# Patient Record
Sex: MALE | Race: BLACK OR AFRICAN AMERICAN | NOT HISPANIC OR LATINO | Employment: UNEMPLOYED | URBAN - METROPOLITAN AREA
[De-identification: names, ages, dates, MRNs, and addresses within clinical notes are randomized per-mention and may not be internally consistent; named-entity substitution may affect disease eponyms.]

---

## 2019-07-26 PROBLEM — Z12.5 PROSTATE CANCER SCREENING: Status: ACTIVE | Noted: 2019-07-26

## 2019-07-26 PROBLEM — R31.29 MICROSCOPIC HEMATURIA: Status: ACTIVE | Noted: 2019-07-26

## 2019-07-26 PROBLEM — R35.1 NOCTURIA: Status: ACTIVE | Noted: 2019-07-26

## 2019-07-26 PROBLEM — N40.0 BENIGN PROSTATIC HYPERPLASIA: Status: ACTIVE | Noted: 2019-07-26

## 2020-01-27 PROBLEM — I73.9 PVD (PERIPHERAL VASCULAR DISEASE): Status: ACTIVE | Noted: 2020-01-27

## 2020-01-27 PROBLEM — E87.6 HYPOKALEMIA: Status: ACTIVE | Noted: 2020-01-27

## 2020-01-27 PROBLEM — E78.5 HYPERLIPIDEMIA LDL GOAL <70: Status: ACTIVE | Noted: 2020-01-27

## 2020-01-27 PROBLEM — E11.9 DIABETES MELLITUS TYPE II, CONTROLLED: Status: ACTIVE | Noted: 2019-11-21

## 2020-01-27 PROBLEM — E03.9 HYPOTHYROIDISM, ADULT: Status: ACTIVE | Noted: 2020-01-27

## 2020-12-10 ENCOUNTER — OFFICE VISIT (OUTPATIENT)
Dept: PODIATRY | Facility: CLINIC | Age: 60
End: 2020-12-10
Payer: MEDICAID

## 2020-12-10 ENCOUNTER — OFFICE VISIT (OUTPATIENT)
Dept: PRIMARY CARE CLINIC | Facility: CLINIC | Age: 60
End: 2020-12-10
Payer: MEDICAID

## 2020-12-10 VITALS
WEIGHT: 163 LBS | TEMPERATURE: 98 F | DIASTOLIC BLOOD PRESSURE: 78 MMHG | HEIGHT: 71 IN | SYSTOLIC BLOOD PRESSURE: 140 MMHG | HEART RATE: 63 BPM | OXYGEN SATURATION: 98 % | BODY MASS INDEX: 22.82 KG/M2

## 2020-12-10 VITALS — WEIGHT: 161.81 LBS | BODY MASS INDEX: 23.17 KG/M2 | HEIGHT: 70 IN

## 2020-12-10 DIAGNOSIS — B35.1 PAIN DUE TO ONYCHOMYCOSIS OF TOENAIL OF RIGHT FOOT: Primary | ICD-10-CM

## 2020-12-10 DIAGNOSIS — E11.51 TYPE II DIABETES MELLITUS WITH PERIPHERAL CIRCULATORY DISORDER: ICD-10-CM

## 2020-12-10 DIAGNOSIS — B35.1 ONYCHOMYCOSIS OF MULTIPLE TOENAILS WITH TYPE 2 DIABETES MELLITUS: ICD-10-CM

## 2020-12-10 DIAGNOSIS — M79.674 PAIN DUE TO ONYCHOMYCOSIS OF TOENAIL OF RIGHT FOOT: Primary | ICD-10-CM

## 2020-12-10 DIAGNOSIS — L03.031: ICD-10-CM

## 2020-12-10 DIAGNOSIS — N40.0 BENIGN PROSTATIC HYPERPLASIA, UNSPECIFIED WHETHER LOWER URINARY TRACT SYMPTOMS PRESENT: ICD-10-CM

## 2020-12-10 DIAGNOSIS — I10 ESSENTIAL HYPERTENSION: ICD-10-CM

## 2020-12-10 DIAGNOSIS — I25.2 HISTORY OF NON-ST ELEVATION MYOCARDIAL INFARCTION (NSTEMI): ICD-10-CM

## 2020-12-10 DIAGNOSIS — E11.69 ONYCHOMYCOSIS OF MULTIPLE TOENAILS WITH TYPE 2 DIABETES MELLITUS: ICD-10-CM

## 2020-12-10 DIAGNOSIS — E11.9 TYPE 2 DIABETES MELLITUS WITHOUT COMPLICATION, WITHOUT LONG-TERM CURRENT USE OF INSULIN: ICD-10-CM

## 2020-12-10 DIAGNOSIS — B35.1 ONYCHOMYCOSIS DUE TO DERMATOPHYTE: ICD-10-CM

## 2020-12-10 DIAGNOSIS — I25.10 CORONARY ARTERY DISEASE, ANGINA PRESENCE UNSPECIFIED, UNSPECIFIED VESSEL OR LESION TYPE, UNSPECIFIED WHETHER NATIVE OR TRANSPLANTED HEART: ICD-10-CM

## 2020-12-10 DIAGNOSIS — L03.031: Primary | ICD-10-CM

## 2020-12-10 DIAGNOSIS — E03.9 HYPOTHYROIDISM, UNSPECIFIED TYPE: ICD-10-CM

## 2020-12-10 LAB — HM FOOT EXAM: NORMAL

## 2020-12-10 PROCEDURE — 99203 PR OFFICE/OUTPT VISIT, NEW, LEVL III, 30-44 MIN: ICD-10-PCS | Mod: 25,S$PBB,, | Performed by: PODIATRIST

## 2020-12-10 PROCEDURE — 99215 OFFICE O/P EST HI 40 MIN: CPT | Mod: PBBFAC,27,PN | Performed by: PODIATRIST

## 2020-12-10 PROCEDURE — 99999 PR PBB SHADOW E&M-EST. PATIENT-LVL V: ICD-10-PCS | Mod: PBBFAC,,, | Performed by: PODIATRIST

## 2020-12-10 PROCEDURE — 99204 OFFICE O/P NEW MOD 45 MIN: CPT | Mod: S$PBB,,, | Performed by: FAMILY MEDICINE

## 2020-12-10 PROCEDURE — 99203 OFFICE O/P NEW LOW 30 MIN: CPT | Mod: 25,S$PBB,, | Performed by: PODIATRIST

## 2020-12-10 PROCEDURE — 99999 PR PBB SHADOW E&M-EST. PATIENT-LVL V: CPT | Mod: PBBFAC,,, | Performed by: PODIATRIST

## 2020-12-10 PROCEDURE — 99214 OFFICE O/P EST MOD 30 MIN: CPT | Mod: PBBFAC,PN | Performed by: FAMILY MEDICINE

## 2020-12-10 PROCEDURE — 99204 PR OFFICE/OUTPT VISIT, NEW, LEVL IV, 45-59 MIN: ICD-10-PCS | Mod: S$PBB,,, | Performed by: FAMILY MEDICINE

## 2020-12-10 PROCEDURE — 99999 PR PBB SHADOW E&M-EST. PATIENT-LVL IV: CPT | Mod: PBBFAC,,, | Performed by: FAMILY MEDICINE

## 2020-12-10 PROCEDURE — 99999 PR PBB SHADOW E&M-EST. PATIENT-LVL IV: ICD-10-PCS | Mod: PBBFAC,,, | Performed by: FAMILY MEDICINE

## 2020-12-10 PROCEDURE — 11720 DEBRIDE NAIL 1-5: CPT | Mod: Q8,PBBFAC,PN | Performed by: PODIATRIST

## 2020-12-10 PROCEDURE — 11720 ROUTINE FOOT CARE: ICD-10-PCS | Mod: S$PBB,,, | Performed by: PODIATRIST

## 2020-12-10 RX ORDER — LISINOPRIL 10 MG/1
20 TABLET ORAL DAILY
Status: ON HOLD
Start: 2020-12-10 | End: 2022-10-29 | Stop reason: SDUPTHER

## 2020-12-10 RX ORDER — METOPROLOL SUCCINATE 25 MG/1
TABLET, EXTENDED RELEASE ORAL
COMMUNITY
Start: 2020-11-11

## 2020-12-10 RX ORDER — LISINOPRIL 10 MG/1
10 TABLET ORAL
COMMUNITY
Start: 2020-03-27 | End: 2020-12-10 | Stop reason: SDUPTHER

## 2020-12-10 RX ORDER — ASPIRIN 81 MG/1
81 TABLET ORAL DAILY
Qty: 90 TABLET | Refills: 1 | Status: ON HOLD | OUTPATIENT
Start: 2020-12-10 | End: 2022-10-29 | Stop reason: SDUPTHER

## 2020-12-10 RX ORDER — ISOSORBIDE MONONITRATE 30 MG/1
30 TABLET, EXTENDED RELEASE ORAL DAILY
COMMUNITY

## 2020-12-10 RX ORDER — MUPIROCIN 20 MG/G
OINTMENT TOPICAL 3 TIMES DAILY
Qty: 1 TUBE | Refills: 0 | Status: SHIPPED | OUTPATIENT
Start: 2020-12-10

## 2020-12-10 NOTE — PROGRESS NOTES
Clinic Note  12/10/2020      Subjective:       Patient ID:  Johny is a 60 y.o. male being seen for an new visit.      Chief Complaint: Establish Care and Toe Pain (Right side(next to big toe))    Establish care-was incarcerated for 30 years for attempted robbery and attempted homicide.  Was released earlier this year and went to [facility] for care.  Living with older brother.  Would like to establish care here.      Right 2nd toe pain-present over the last several weeks and patient reports erythema, swelling, pain.  Treated with topical antibiotic which helps some, but still painful.  Patient was supposed to get referral for Podiatry, but for some reason unable to follow-up.    Hypothyroid-taking Synthroid    Hypertension / coronary artery disease / history of NSTEMI-has been out of aspirin not taking it.  Taking Imdur, lisinopril 10 mg, metoprolol, statin.    Type 2 diabetes-taking metformin    BPH-taking terazosin      History reviewed. No pertinent family history.  Social History     Socioeconomic History    Marital status: Single     Spouse name: Not on file    Number of children: Not on file    Years of education: Not on file    Highest education level: Not on file   Occupational History    Not on file   Social Needs    Financial resource strain: Not on file    Food insecurity     Worry: Not on file     Inability: Not on file    Transportation needs     Medical: Not on file     Non-medical: Not on file   Tobacco Use    Smoking status: Never Smoker    Smokeless tobacco: Former User   Substance and Sexual Activity    Alcohol use: Never     Frequency: Never    Drug use: Never    Sexual activity: Not on file   Lifestyle    Physical activity     Days per week: Not on file     Minutes per session: Not on file    Stress: Not on file   Relationships    Social connections     Talks on phone: Not on file     Gets together: Not on file     Attends [employer] service: Not on file     Active member of club or  organization: Not on file     Attends meetings of clubs or organizations: Not on file     Relationship status: Not on file   Other Topics Concern    Not on file   Social History Narrative    Not on file     History reviewed. No pertinent surgical history.  Medication List with Changes/Refills   New Medications    MUPIROCIN (BACTROBAN) 2 % OINTMENT    Apply topically 3 (three) times daily.   Current Medications    ISOSORBIDE MONONITRATE (IMDUR) 30 MG 24 HR TABLET    Take 30 mg by mouth once daily.    LEVOTHYROXINE (SYNTHROID) 25 MCG TABLET    Take 50 mcg by mouth.    METFORMIN (GLUCOPHAGE) 500 MG TABLET    Take 500 mg by mouth 2 (two) times daily with meals.    METOPROLOL SUCCINATE (TOPROL-XL) 25 MG 24 HR TABLET    TK 1/2 T PO QD    NITROGLYCERIN (NITROSTAT) 0.4 MG SL TABLET    Place 0.4 mg under the tongue.    ROSUVASTATIN (CRESTOR) 40 MG TAB    Take 1 tablet (40 mg total) by mouth every evening.    TERAZOSIN (HYTRIN) 2 MG CAPSULE    Take 1 capsule (2 mg total) by mouth once daily.   Changed and/or Refilled Medications    Modified Medication Previous Medication    ASPIRIN (ECOTRIN) 81 MG EC TABLET aspirin (ECOTRIN) 81 MG EC tablet       Take 1 tablet (81 mg total) by mouth once daily.    Take 81 mg by mouth.    LISINOPRIL 10 MG TABLET lisinopriL 10 MG tablet       Take 2 tablets (20 mg total) by mouth once daily.    Take 10 mg by mouth.   Discontinued Medications    ACETAMINOPHEN (TYLENOL) 325 MG TABLET    Take 650 mg by mouth.    AMLODIPINE (NORVASC) 2.5 MG TABLET    Take 5 mg by mouth.    CALCIUM CARBONATE (TUMS) 200 MG CALCIUM (500 MG) CHEWABLE TABLET    Take 2 tablets by mouth 2 (two) times daily as needed for Heartburn.    FINASTERIDE (PROSCAR) 5 MG TABLET    Take 1 tablet (5 mg total) by mouth once daily.    IBUPROFEN (ADVIL,MOTRIN) 600 MG TABLET    Take 600 mg by mouth.    LISINOPRIL 10 MG TABLET    Take 10 mg by mouth once daily.    METOPROLOL TARTRATE (LOPRESSOR) 25 MG TABLET    Take 12.5 mg by mouth.     "OMEPRAZOLE (PRILOSEC) 20 MG CAPSULE    Take 20 mg by mouth.     Patient Active Problem List   Diagnosis    Benign prostatic hyperplasia    Microscopic hematuria    Prostate cancer screening    Nocturia    CAD (coronary artery disease)    Chest pain on exertion    Claudication, class II    Type 2 diabetes mellitus without complication, without long-term current use of insulin    Hemoptysis    Essential hypertension    Hypokalemia    Hypothyroidism    Laryngopharyngeal reflux (LPR)    NSTEMI (non-ST elevated myocardial infarction)    Perforation of tympanic membrane    PVD (peripheral vascular disease)    Hyperlipidemia LDL goal <70     Review of Systems   Constitutional: Negative for chills, fever, malaise/fatigue and weight loss.   HENT: Negative for congestion, sinus pain and sore throat.    Respiratory: Negative for cough, shortness of breath and wheezing.    Cardiovascular: Negative for chest pain and palpitations.   Gastrointestinal: Negative for constipation, diarrhea, nausea and vomiting.   Genitourinary: Negative for dysuria, frequency and urgency.   Musculoskeletal: Negative for myalgias.   Skin: Negative for rash.   Neurological: Negative for headaches.         Objective:      BP (!) 140/78 (BP Location: Left arm, Patient Position: Sitting, BP Method: Large (Manual))   Pulse 63   Temp 98.2 °F (36.8 °C) (Oral)   Ht 5' 10.5" (1.791 m)   Wt 73.9 kg (163 lb 0.5 oz)   SpO2 98%   BMI 23.06 kg/m²   Estimated body mass index is 23.06 kg/m² as calculated from the following:    Height as of this encounter: 5' 10.5" (1.791 m).    Weight as of this encounter: 73.9 kg (163 lb 0.5 oz).  Physical Exam   Constitutional: He is oriented to person, place, and time and well-developed, well-nourished, and in no distress. No distress.   HENT:   Head: Normocephalic and atraumatic.   Eyes: Conjunctivae and EOM are normal.   Neck: Normal range of motion.   Cardiovascular: Normal rate, regular rhythm and " normal heart sounds.   Pulmonary/Chest: Effort normal and breath sounds normal. No respiratory distress. He has no wheezes.   Abdominal: Soft. Bowel sounds are normal.   Musculoskeletal: Normal range of motion.        Feet:    Neurological: He is alert and oriented to person, place, and time. GCS score is 15.   Skin: Skin is warm and dry. No rash noted. He is not diaphoretic. No erythema.   Psychiatric: Affect normal.   Vitals reviewed.        Assessment and Plan:     1. Inflammation of toenail, right  - does not appear infected at this time, but will refer to podiatry for toenail clipping.  Can continue Bactroban p.r.n.  - Ambulatory referral/consult to Podiatry; Future  - mupirocin (BACTROBAN) 2 % ointment; Apply topically 3 (three) times daily.  Dispense: 1 Tube; Refill: 0    2. History of non-ST elevation myocardial infarction (NSTEMI)  - stressed the importance of taking aspirin.  Continue aspirin, Imdur, metoprolol, Crestor.  - aspirin (ECOTRIN) 81 MG EC tablet; Take 1 tablet (81 mg total) by mouth once daily.  Dispense: 90 tablet; Refill: 1    3. Coronary artery disease, angina presence unspecified, unspecified vessel or lesion type, unspecified whether native or transplanted heart  - see above  - aspirin (ECOTRIN) 81 MG EC tablet; Take 1 tablet (81 mg total) by mouth once daily.  Dispense: 90 tablet; Refill: 1    4. Essential hypertension  - blood pressure 140/78, not controlled.  Increase from lisinopril 10 mg to 20 mg daily.  Will recheck BMP next visit.    5. Hypothyroidism, unspecified type  - on Synthroid 25 mcg.  Will check TSH next visit    6. Type 2 diabetes mellitus without complication, without long-term current use of insulin  - on metformin 500 mg b.i.d..  Last hemoglobin A1c 7.1.  Well controlled.    7. Benign prostatic hyperplasia, unspecified whether lower urinary tract symptoms present  - continue terazosin        Follow up:   Follow up for 2 weeks for BP recheck and chronic med f/u.  Patient  to bring all his pill bottles    Other Orders Placed This Visit:  Orders Placed This Encounter   Procedures    Ambulatory referral/consult to Podiatry     Standing Status:   Future     Number of Occurrences:   1     Standing Expiration Date:   1/10/2022     Referral Priority:   Routine     Referral Type:   Consultation     Referral Reason:   Specialty Services Required     Requested Specialty:   Podiatry     Number of Visits Requested:   1           Alvino Ennis MD

## 2020-12-10 NOTE — PATIENT INSTRUCTIONS
For your fungal(mycotic) nails, pick one & use for at least 3-6 months:    1. Listerine mouthwash (yellow/gold) - soak for 5-10minutes daily (may re-use solution up to a week)    2. Vicks Vaporub to nails daily after a bath/end of day/bedtime.    3. Lamisil (terbanafine) 1% antifungal cream daily to nails after shower.

## 2020-12-10 NOTE — LETTER
December 23, 2020      Alvino Ennis MD  [address]  [facility] [address]           [facility]  [address], [facility] [address]  [facility] [address]  Phone: [phone]  Fax: [phone]          Patient: Johny Farrell   MR Number: [id number]   YOB: 1960   Date of Visit: 12/10/2020       Dear Dr. Dunbar:    Thank you for referring Johny Farrell to me for evaluation. Attached you will find relevant portions of my assessment and plan of care.    If you have questions, please do not hesitate to call me. I look forward to following Johny Farrell along with you.    Sincerely,    Bibiana Pang, DPPROSPER    Enclosure  CC:  No Recipients    If you would like to receive this communication electronically, please contact [email or web address] or [phone] to request more information on [employer] access.    For providers and/or their staff who would like to refer a patient to [facility], please contact us through our one-stop-shop provider referral line, [facility], at [phone].    If you feel you have received this communication in error or would no longer like to receive these types of communications, please e-mail [email or web address]

## 2020-12-14 PROBLEM — E11.51 TYPE II DIABETES MELLITUS WITH PERIPHERAL CIRCULATORY DISORDER: Status: ACTIVE | Noted: 2019-11-21

## 2020-12-14 NOTE — PROGRESS NOTES
Subjective:      Patient ID: Johny Farrell is a 60 y.o. male.    Chief Complaint: Nail Care (right toe 2nd toe )    Johny is a 60 y.o. male who presents to the clinic for evaluation and treatment of high risk feet.   Johny has a past medical history of Benign prostatic hyperplasia (7/26/2019), BPH (benign prostatic hyperplasia), CAD (coronary artery disease) (3/9/2015), Chest pain on exertion (4/20/2015), Claudication, class II (12/22/2015), Diabetes mellitus type II, controlled (11/21/2019), Hemoptysis (12/12/2014), HTN (hypertension) (3/9/2015), Hyperlipidemia LDL goal <70 (1/27/2020), Hypokalemia (1/27/2020), Hypothyroidism, adult (1/27/2020), Laryngopharyngeal reflux (LPR) (12/12/2014), Microscopic hematuria (7/26/2019), Nocturia (7/26/2019), NSTEMI (non-ST elevated myocardial infarction) (3/9/2015), Perforation of tympanic membrane (5/3/2013), Prostate cancer screening (7/26/2019), and PVD (peripheral vascular disease) (1/27/2020).   The patient's chief complaint is long, thick toenail right 2nd.   This patient has documented high risk feet requiring routine maintenance secondary to diabetes mellitis and those secondary complications of diabetes, as mentioned.    PCP: Alvino Ennis MD    Date Last Seen by PCP: today 12/10/20    Hemoglobin A1C   Date Value Ref Range Status   07/22/2020 7.1 (H) 4.7 - 5.6 % Final        Objective:      Review of Systems   Constitution: Negative for malaise/fatigue.   Cardiovascular: Positive for claudication and dyspnea on exertion. Negative for leg swelling.   Skin: Positive for color change, dry skin and nail changes. Negative for itching, rash, suspicious lesions and unusual hair distribution.   Musculoskeletal: Negative for arthritis, back pain, joint pain and muscle weakness.   Neurological: Negative for paresthesias, sensory change and weakness.   Psychiatric/Behavioral: The patient is not nervous/anxious.        Physical Exam  Vitals reviewed: BP cuff not functioning.    Constitutional:       General: He is not in acute distress.     Appearance: Normal appearance. He is well-developed and normal weight.   Cardiovascular:      Pulses:           Dorsalis pedis pulses are 0 on the right side and 0 on the left side.        Posterior tibial pulses are 2+ on the right side and 2+ on the left side.   Musculoskeletal: Normal range of motion.         General: No swelling.      Right lower leg: No edema.      Left lower leg: No edema.   Feet:      Right foot:      Skin integrity: Skin integrity normal. No erythema, warmth or callus.      Toenail Condition: Right toenails are abnormally thick. Fungal disease present.     Left foot:      Skin integrity: No warmth.   Skin:     General: Skin is warm and dry.      Capillary Refill: Capillary refill takes 2 to 3 seconds.      Findings: No bruising, erythema, lesion or rash.      Nails: There is no clubbing.     Neurological:      Mental Status: He is alert and oriented to person, place, and time.      Sensory: Sensation is intact. No sensory deficit.      Motor: Motor function is intact. No weakness or abnormal muscle tone.      Gait: Gait is intact. Gait normal.   Psychiatric:         Mood and Affect: Mood and affect normal.         Behavior: Behavior normal. Behavior is cooperative.          Assessment:      Encounter Diagnoses   Name Primary?    Inflammation of toenail, right     Pain due to onychomycosis of toenail of right foot Yes    Onychomycosis of multiple toenails with type 2 diabetes mellitus     Type II diabetes mellitus with peripheral circulatory disorder     Onychomycosis due to dermatophyte      Problem List Items Addressed This Visit        Derm    Pain due to onychomycosis of toenail of right foot - Primary    Current Assessment & Plan     Significant dystrophic changes, thickening & discoloration of nail right 2nd toe, w/ distal impingement but no acute inflammation; no erythema nor open wound noted.  Onychogryphotic nail  right 2nd  Nail debrided & reduced sharply w/ sterile nail nippers - relief noted.         Relevant Orders    Routine Foot Care    Onychomycosis of multiple toenails with type 2 diabetes mellitus    Current Assessment & Plan     May continue Clotrimazole cream if preferred.  Other OTC antifungal tx options provided w/ instructions.  Nails 1-5 R thickened w/ subungual debris & discolored, consistent w/ mycosis. Left shoe not removed.         Relevant Orders    Routine Foot Care       ID    Inflammation of toenail, right    Relevant Orders    Routine Foot Care       Endocrine    Type II diabetes mellitus with peripheral circulatory disorder    Relevant Orders    Routine Foot Care      Other Visit Diagnoses     Onychomycosis due to dermatophyte                Plan:      Johny was seen today for nail care.    Diagnoses and all orders for this visit:    Pain due to onychomycosis of toenail of right foot  -     Routine Foot Care    Inflammation of toenail, right  -     Ambulatory referral/consult to Podiatry  -     Routine Foot Care    Onychomycosis of multiple toenails with type 2 diabetes mellitus  -     Routine Foot Care    Type II diabetes mellitus with peripheral circulatory disorder  -     Routine Foot Care    Onychomycosis due to dermatophyte    I counseled the patient on his conditions, their implications and medical management.    - Shoe inspection. Diabetic Foot Education. Patient reminded of the importance of good nutrition and blood sugar control to help prevent podiatric complications of diabetes. Patient instructed on proper foot hygeine. We discussed wearing proper shoe gear, daily foot inspections, never walking without protective shoe gear, never putting sharp instruments to feet.      - With patient's permission, nails were aggressively reduced and debrided R foot to their soft tissue attachment mechanically, removing all offending nail and debris. Patient relates relief following the procedure. He  will continue to monitor the areas daily, inspect his feet, wear protective shoe gear when ambulatory, moisturizer to maintain skin integrity and follow in this office in annually for DM foot care, sooner prn.

## 2020-12-22 ENCOUNTER — OFFICE VISIT (OUTPATIENT)
Dept: PRIMARY CARE CLINIC | Facility: CLINIC | Age: 60
End: 2020-12-22
Payer: MEDICAID

## 2020-12-22 VITALS
HEART RATE: 76 BPM | SYSTOLIC BLOOD PRESSURE: 122 MMHG | HEIGHT: 70 IN | BODY MASS INDEX: 23.39 KG/M2 | OXYGEN SATURATION: 98 % | WEIGHT: 163.38 LBS | TEMPERATURE: 98 F | DIASTOLIC BLOOD PRESSURE: 78 MMHG

## 2020-12-22 DIAGNOSIS — E03.9 HYPOTHYROIDISM, UNSPECIFIED TYPE: ICD-10-CM

## 2020-12-22 DIAGNOSIS — Z23 ENCOUNTER FOR ADMINISTRATION OF VACCINE: ICD-10-CM

## 2020-12-22 DIAGNOSIS — I25.2 HISTORY OF NON-ST ELEVATION MYOCARDIAL INFARCTION (NSTEMI): ICD-10-CM

## 2020-12-22 DIAGNOSIS — Z12.11 COLON CANCER SCREENING: ICD-10-CM

## 2020-12-22 DIAGNOSIS — Z11.59 ENCOUNTER FOR HEPATITIS C SCREENING TEST FOR LOW RISK PATIENT: ICD-10-CM

## 2020-12-22 DIAGNOSIS — E11.51 TYPE II DIABETES MELLITUS WITH PERIPHERAL CIRCULATORY DISORDER: ICD-10-CM

## 2020-12-22 DIAGNOSIS — I10 ESSENTIAL HYPERTENSION: Primary | ICD-10-CM

## 2020-12-22 DIAGNOSIS — Z11.4 SCREENING FOR HIV (HUMAN IMMUNODEFICIENCY VIRUS): ICD-10-CM

## 2020-12-22 PROCEDURE — 99999 PR PBB SHADOW E&M-EST. PATIENT-LVL V: ICD-10-PCS | Mod: PBBFAC,,, | Performed by: FAMILY MEDICINE

## 2020-12-22 PROCEDURE — 90686 IIV4 VACC NO PRSV 0.5 ML IM: CPT | Mod: PBBFAC,PN

## 2020-12-22 PROCEDURE — 99215 OFFICE O/P EST HI 40 MIN: CPT | Mod: PBBFAC,PN,25 | Performed by: FAMILY MEDICINE

## 2020-12-22 PROCEDURE — 90472 IMMUNIZATION ADMIN EACH ADD: CPT | Mod: PBBFAC,PN

## 2020-12-22 PROCEDURE — 99396 PREV VISIT EST AGE 40-64: CPT | Mod: S$PBB,,, | Performed by: FAMILY MEDICINE

## 2020-12-22 PROCEDURE — 99396 PR PREVENTIVE VISIT,EST,40-64: ICD-10-PCS | Mod: S$PBB,,, | Performed by: FAMILY MEDICINE

## 2020-12-22 PROCEDURE — 99999 PR PBB SHADOW E&M-EST. PATIENT-LVL V: CPT | Mod: PBBFAC,,, | Performed by: FAMILY MEDICINE

## 2020-12-22 NOTE — PROGRESS NOTES
Verified pt ID using name and . Allergies verified. Administered Pneumococcal Polysaccharide - 23 Valent 0.5 ml in right deltoid per physician order using aseptic technique. Aspirated and no blood return noted. Pt tolerated well with no adverse reactions noted.     Verified pt ID using name and . Allergies verified. Administered Influenza - Quadrivalent - PF *Preferred* (6 months and older) 0.5 ML in left deltoid per physician order using aseptic technique. Aspirated and no blood return noted. Pt tolerated well with no adverse reactions noted.

## 2020-12-22 NOTE — PROGRESS NOTES
Clinic Note  12/22/2020      Subjective:       Patient ID:  Johny is a 60 y.o. male being seen for an established visit.    Chief Complaint: Follow-up    Hypertension-taking blood pressure meds daily without any problems    Right 2nd toe pain-was able to see podiatry and at toenails clipped.  Symptoms are improved.    Hypothyroidism-taking Synthroid without problems    Type 2 diabetes -  taking metformin      Review of Systems   Constitutional: Negative for chills, fever, malaise/fatigue and weight loss.   HENT: Negative for congestion, sinus pain and sore throat.    Respiratory: Negative for cough, shortness of breath and wheezing.    Cardiovascular: Negative for chest pain and palpitations.   Gastrointestinal: Negative for constipation, diarrhea, nausea and vomiting.   Genitourinary: Negative for dysuria, frequency and urgency.   Musculoskeletal: Negative for myalgias.   Skin: Negative for rash.   Neurological: Negative for headaches.       Medication List with Changes/Refills   Current Medications    ASPIRIN (ECOTRIN) 81 MG EC TABLET    Take 1 tablet (81 mg total) by mouth once daily.    ISOSORBIDE MONONITRATE (IMDUR) 30 MG 24 HR TABLET    Take 30 mg by mouth once daily.    LEVOTHYROXINE (SYNTHROID) 25 MCG TABLET    Take 50 mcg by mouth.    LISINOPRIL 10 MG TABLET    Take 2 tablets (20 mg total) by mouth once daily.    METFORMIN (GLUCOPHAGE) 500 MG TABLET    Take 500 mg by mouth 2 (two) times daily with meals.    METOPROLOL SUCCINATE (TOPROL-XL) 25 MG 24 HR TABLET    TK 1/2 T PO QD    MUPIROCIN (BACTROBAN) 2 % OINTMENT    Apply topically 3 (three) times daily.    NITROGLYCERIN (NITROSTAT) 0.4 MG SL TABLET    Place 0.4 mg under the tongue.    ROSUVASTATIN (CRESTOR) 40 MG TAB    Take 1 tablet (40 mg total) by mouth every evening.    TERAZOSIN (HYTRIN) 2 MG CAPSULE    Take 1 capsule (2 mg total) by mouth once daily.       Patient Active Problem List   Diagnosis    Benign prostatic hyperplasia    Microscopic  "hematuria    Prostate cancer screening    Nocturia    CAD (coronary artery disease)    Chest pain on exertion    Claudication, class II    Type II diabetes mellitus with peripheral circulatory disorder    Hemoptysis    Essential hypertension    Hypokalemia    Hypothyroidism    Laryngopharyngeal reflux (LPR)    NSTEMI (non-ST elevated myocardial infarction)    Perforation of tympanic membrane    PVD (peripheral vascular disease)    Hyperlipidemia LDL goal <70    Pain due to onychomycosis of toenail of right foot    Onychomycosis of multiple toenails with type 2 diabetes mellitus    Inflammation of toenail, right           Objective:      /78 (BP Location: Left arm, Patient Position: Sitting, BP Method: Large (Manual))   Pulse 76   Temp 98 °F (36.7 °C) (Oral)   Ht 5' 10" (1.778 m)   Wt 74.1 kg (163 lb 5.8 oz)   SpO2 98%   BMI 23.44 kg/m²   Estimated body mass index is 23.44 kg/m² as calculated from the following:    Height as of this encounter: 5' 10" (1.778 m).    Weight as of this encounter: 74.1 kg (163 lb 5.8 oz).  Physical Exam   Constitutional: He is oriented to person, place, and time and well-developed, well-nourished, and in no distress. No distress.   HENT:   Head: Normocephalic and atraumatic.   Eyes: Conjunctivae and EOM are normal.   Neck: Normal range of motion.   Cardiovascular: Normal rate, regular rhythm and normal heart sounds.   Pulmonary/Chest: Effort normal and breath sounds normal. No respiratory distress. He has no wheezes.   Abdominal: Soft. Bowel sounds are normal.   Musculoskeletal: Normal range of motion.   Neurological: He is alert and oriented to person, place, and time. GCS score is 15.   Skin: Skin is warm and dry. No rash noted. He is not diaphoretic. No erythema.   Psychiatric: Affect normal.   Vitals reviewed.        Assessment and Plan:     1. Essential hypertension  - blood pressure 122/78.  Continue lisinopril,  metoprolol succinate, Imdur, aspirin, " Crestor  - Basic Metabolic Panel; Future    2. History of non-ST elevation myocardial infarction (NSTEMI)  - stable, on good medication regimen.  Has been seen by cardiology at [facility]    3. Hypothyroidism, unspecified type  - continue Synthroid 50 mcg daily  - TSH; Future    4. Type II diabetes mellitus with peripheral circulatory disorder  - hemoglobin A1c 7.1.  Continue metformin 500 mg b.i.d..  Recheck in 6 months  - referred to optometry for diabetic retinopathy screening  - Ambulatory referral/consult to Optometry; Future    5. Encounter for hepatitis C screening test for low risk patient  - Hepatitis C Antibody; Future    6. Screening for HIV (human immunodeficiency virus)  - HIV 1/2 Ag/Ab (4th Gen); Future    7. Colon cancer screening  - Ambulatory referral/consult to Gastroenterology; Future    8. Encounter for administration of vaccine  - Pneumococcal Polysaccharide Vaccine (23 Valent) (SQ/IM)  - Influenza - Quadrivalent (PF)        Follow Up:   Follow up in about 6 months (around 6/22/2021) for Chronic medical follow-up.    Other Orders Placed This Visit:  Orders Placed This Encounter   Procedures    Pneumococcal Polysaccharide Vaccine (23 Valent) (SQ/IM)    Influenza - Quadrivalent (PF)    TSH    Basic Metabolic Panel    Hepatitis C Antibody    HIV 1/2 Ag/Ab (4th Gen)    Ambulatory referral/consult to Gastroenterology    Ambulatory referral/consult to Optometry         Alvino Ennis MD

## 2020-12-24 NOTE — PROCEDURES
Routine Foot Care    Date/Time: 12/10/2020 3:30 PM  Performed by: Bibiana Pang DPM  Authorized by: Bibiana Pang DPM     Consent Done?:  Yes (Verbal)    Nail Care Type:  Debride(Right 1st Toe, Right 2nd Toe, Right 3rd Toe, Right 4th Toe and Right 5th Toe)  Patient tolerance:  Patient tolerated the procedure well with no immediate complications

## 2020-12-24 NOTE — ASSESSMENT & PLAN NOTE
May continue Clotrimazole cream if preferred.  Other OTC antifungal tx options provided w/ instructions.  Nails 1-5 R thickened w/ subungual debris & discolored, consistent w/ mycosis. Left shoe not removed.

## 2020-12-24 NOTE — ASSESSMENT & PLAN NOTE
Significant dystrophic changes, thickening & discoloration of nail right 2nd toe, w/ distal impingement but no acute inflammation; no erythema nor open wound noted.  Onychogryphotic nail right 2nd  Nail debrided & reduced sharply w/ sterile nail nippers - relief noted.

## 2020-12-30 ENCOUNTER — PATIENT OUTREACH (OUTPATIENT)
Dept: ADMINISTRATIVE | Facility: HOSPITAL | Age: 60
End: 2020-12-30

## 2020-12-30 NOTE — PROGRESS NOTES
Voicemail left with patient for assistance scheduling eye exam and colonoscopy. Will follow up in one week

## 2021-01-21 ENCOUNTER — PATIENT OUTREACH (OUTPATIENT)
Dept: ADMINISTRATIVE | Facility: HOSPITAL | Age: 61
End: 2021-01-21

## 2021-02-04 ENCOUNTER — TELEPHONE (OUTPATIENT)
Dept: PRIMARY CARE CLINIC | Facility: CLINIC | Age: 61
End: 2021-02-04

## 2021-02-04 DIAGNOSIS — Z12.11 COLON CANCER SCREENING: Primary | ICD-10-CM

## 2021-02-19 ENCOUNTER — TELEPHONE (OUTPATIENT)
Dept: PRIMARY CARE CLINIC | Facility: CLINIC | Age: 61
End: 2021-02-19

## 2021-02-19 DIAGNOSIS — Z12.11 SCREENING FOR COLORECTAL CANCER: Primary | ICD-10-CM

## 2021-02-19 DIAGNOSIS — Z12.12 SCREENING FOR COLORECTAL CANCER: Primary | ICD-10-CM

## 2021-03-02 ENCOUNTER — PATIENT OUTREACH (OUTPATIENT)
Dept: ADMINISTRATIVE | Facility: OTHER | Age: 61
End: 2021-03-02

## 2021-03-02 DIAGNOSIS — E11.51 TYPE II DIABETES MELLITUS WITH PERIPHERAL CIRCULATORY DISORDER: ICD-10-CM

## 2021-03-02 DIAGNOSIS — L03.031: Primary | ICD-10-CM

## 2021-03-30 ENCOUNTER — TELEPHONE (OUTPATIENT)
Dept: ENDOSCOPY | Facility: HOSPITAL | Age: 61
End: 2021-03-30

## 2021-06-24 DIAGNOSIS — E11.9 TYPE 2 DIABETES MELLITUS WITHOUT COMPLICATION: ICD-10-CM

## 2021-06-30 DIAGNOSIS — E11.9 TYPE 2 DIABETES MELLITUS WITHOUT COMPLICATION, UNSPECIFIED WHETHER LONG TERM INSULIN USE: ICD-10-CM

## 2021-07-06 ENCOUNTER — PATIENT OUTREACH (OUTPATIENT)
Dept: ADMINISTRATIVE | Facility: HOSPITAL | Age: 61
End: 2021-07-06

## 2021-07-06 DIAGNOSIS — E78.2 MIXED HYPERLIPIDEMIA: Primary | ICD-10-CM

## 2021-07-19 ENCOUNTER — LAB VISIT (OUTPATIENT)
Dept: PRIMARY CARE CLINIC | Facility: CLINIC | Age: 61
End: 2021-07-19
Payer: MEDICAID

## 2021-07-19 ENCOUNTER — CLINICAL SUPPORT (OUTPATIENT)
Dept: PRIMARY CARE CLINIC | Facility: CLINIC | Age: 61
End: 2021-07-19
Payer: MEDICAID

## 2021-07-19 DIAGNOSIS — E11.9 TYPE 2 DIABETES MELLITUS WITHOUT COMPLICATION: ICD-10-CM

## 2021-07-19 DIAGNOSIS — E11.51 TYPE II DIABETES MELLITUS WITH PERIPHERAL CIRCULATORY DISORDER: ICD-10-CM

## 2021-07-19 DIAGNOSIS — E78.2 MIXED HYPERLIPIDEMIA: ICD-10-CM

## 2021-07-19 LAB
CHOLEST SERPL-MCNC: 184 MG/DL (ref 120–199)
CHOLEST/HDLC SERPL: 2.6 {RATIO} (ref 2–5)
ESTIMATED AVG GLUCOSE: 103 MG/DL (ref 68–131)
HBA1C MFR BLD: 5.2 % (ref 4–5.6)
HDLC SERPL-MCNC: 72 MG/DL (ref 40–75)
HDLC SERPL: 39.1 % (ref 20–50)
LDLC SERPL CALC-MCNC: 97.2 MG/DL (ref 63–159)
NONHDLC SERPL-MCNC: 112 MG/DL
TRIGL SERPL-MCNC: 74 MG/DL (ref 30–150)

## 2021-07-19 PROCEDURE — 83036 HEMOGLOBIN GLYCOSYLATED A1C: CPT | Performed by: FAMILY MEDICINE

## 2021-07-19 PROCEDURE — 82570 ASSAY OF URINE CREATININE: CPT | Performed by: FAMILY MEDICINE

## 2021-07-19 PROCEDURE — 36415 COLL VENOUS BLD VENIPUNCTURE: CPT | Mod: PBBFAC,PN

## 2021-07-19 PROCEDURE — 82043 UR ALBUMIN QUANTITATIVE: CPT | Performed by: FAMILY MEDICINE

## 2021-07-19 PROCEDURE — 80061 LIPID PANEL: CPT | Performed by: FAMILY MEDICINE

## 2021-07-20 LAB
ALBUMIN/CREAT UR: 3.2 UG/MG (ref 0–30)
CREAT UR-MCNC: 279 MG/DL (ref 23–375)
MICROALBUMIN UR DL<=1MG/L-MCNC: 9 UG/ML

## 2021-07-21 DIAGNOSIS — I25.10 CORONARY ARTERY DISEASE, ANGINA PRESENCE UNSPECIFIED, UNSPECIFIED VESSEL OR LESION TYPE, UNSPECIFIED WHETHER NATIVE OR TRANSPLANTED HEART: ICD-10-CM

## 2021-07-21 DIAGNOSIS — E78.5 HYPERLIPIDEMIA LDL GOAL <70: ICD-10-CM

## 2021-07-21 RX ORDER — ROSUVASTATIN CALCIUM 20 MG/1
20 TABLET, COATED ORAL NIGHTLY
Qty: 90 TABLET | Refills: 3 | Status: SHIPPED | OUTPATIENT
Start: 2021-07-21

## 2021-08-02 ENCOUNTER — PATIENT OUTREACH (OUTPATIENT)
Dept: ADMINISTRATIVE | Facility: HOSPITAL | Age: 61
End: 2021-08-02

## 2021-08-16 ENCOUNTER — OFFICE VISIT (OUTPATIENT)
Dept: PRIMARY CARE CLINIC | Facility: CLINIC | Age: 61
End: 2021-08-16
Payer: MEDICAID

## 2021-08-16 VITALS
BODY MASS INDEX: 21.9 KG/M2 | HEIGHT: 70 IN | OXYGEN SATURATION: 97 % | WEIGHT: 153 LBS | HEART RATE: 81 BPM | DIASTOLIC BLOOD PRESSURE: 72 MMHG | SYSTOLIC BLOOD PRESSURE: 130 MMHG

## 2021-08-16 DIAGNOSIS — M25.562 CHRONIC PAIN OF LEFT KNEE: Primary | ICD-10-CM

## 2021-08-16 DIAGNOSIS — I25.10 CORONARY ARTERY DISEASE, ANGINA PRESENCE UNSPECIFIED, UNSPECIFIED VESSEL OR LESION TYPE, UNSPECIFIED WHETHER NATIVE OR TRANSPLANTED HEART: ICD-10-CM

## 2021-08-16 DIAGNOSIS — G89.29 CHRONIC PAIN OF LEFT KNEE: Primary | ICD-10-CM

## 2021-08-16 DIAGNOSIS — N52.9 ERECTILE DYSFUNCTION, UNSPECIFIED ERECTILE DYSFUNCTION TYPE: ICD-10-CM

## 2021-08-16 DIAGNOSIS — E03.9 HYPOTHYROIDISM, UNSPECIFIED TYPE: ICD-10-CM

## 2021-08-16 DIAGNOSIS — H53.8 BLURRY VISION: ICD-10-CM

## 2021-08-16 PROCEDURE — 99999 PR PBB SHADOW E&M-EST. PATIENT-LVL V: ICD-10-PCS | Mod: PBBFAC,,, | Performed by: FAMILY MEDICINE

## 2021-08-16 PROCEDURE — 99999 PR PBB SHADOW E&M-EST. PATIENT-LVL V: CPT | Mod: PBBFAC,,, | Performed by: FAMILY MEDICINE

## 2021-08-16 PROCEDURE — 99214 PR OFFICE/OUTPT VISIT, EST, LEVL IV, 30-39 MIN: ICD-10-PCS | Mod: 25,S$PBB,, | Performed by: FAMILY MEDICINE

## 2021-08-16 PROCEDURE — 99214 OFFICE O/P EST MOD 30 MIN: CPT | Mod: 25,S$PBB,, | Performed by: FAMILY MEDICINE

## 2021-08-16 PROCEDURE — 99215 OFFICE O/P EST HI 40 MIN: CPT | Mod: PBBFAC,PN | Performed by: FAMILY MEDICINE

## 2021-08-16 RX ORDER — LIDOCAINE HYDROCHLORIDE 10 MG/ML
4 INJECTION INFILTRATION; PERINEURAL
Status: DISCONTINUED | OUTPATIENT
Start: 2021-08-16 | End: 2021-08-16 | Stop reason: HOSPADM

## 2021-08-16 RX ORDER — TRIAMCINOLONE ACETONIDE 40 MG/ML
40 INJECTION, SUSPENSION INTRA-ARTICULAR; INTRAMUSCULAR
Status: DISCONTINUED | OUTPATIENT
Start: 2021-08-16 | End: 2021-08-16 | Stop reason: HOSPADM

## 2021-08-16 RX ADMIN — TRIAMCINOLONE ACETONIDE 40 MG: 400 INJECTION, SUSPENSION INTRA-ARTICULAR; INTRAMUSCULAR at 04:08

## 2021-08-16 RX ADMIN — LIDOCAINE HYDROCHLORIDE 4 ML: 10 INJECTION, SOLUTION INFILTRATION; PERINEURAL at 04:08

## 2021-09-13 ENCOUNTER — CLINICAL SUPPORT (OUTPATIENT)
Dept: REHABILITATION | Facility: OTHER | Age: 61
End: 2021-09-13
Payer: MEDICAID

## 2021-09-13 DIAGNOSIS — G89.29 CHRONIC PAIN OF LEFT KNEE: ICD-10-CM

## 2021-09-13 DIAGNOSIS — R26.89 GAIT, ANTALGIC: ICD-10-CM

## 2021-09-13 DIAGNOSIS — R29.898 WEAKNESS OF LEFT LEG: ICD-10-CM

## 2021-09-13 DIAGNOSIS — M25.562 CHRONIC PAIN OF LEFT KNEE: ICD-10-CM

## 2021-09-13 PROCEDURE — 97110 THERAPEUTIC EXERCISES: CPT | Mod: PN

## 2021-09-13 PROCEDURE — 97161 PT EVAL LOW COMPLEX 20 MIN: CPT | Mod: PN

## 2021-10-01 ENCOUNTER — TELEPHONE (OUTPATIENT)
Dept: PRIMARY CARE CLINIC | Facility: CLINIC | Age: 61
End: 2021-10-01

## 2021-10-01 DIAGNOSIS — M25.569 KNEE PAIN, UNSPECIFIED CHRONICITY, UNSPECIFIED LATERALITY: Primary | ICD-10-CM

## 2021-10-27 ENCOUNTER — PATIENT OUTREACH (OUTPATIENT)
Dept: ADMINISTRATIVE | Facility: OTHER | Age: 61
End: 2021-10-27
Payer: MEDICAID

## 2021-10-27 ENCOUNTER — HOSPITAL ENCOUNTER (OUTPATIENT)
Dept: RADIOLOGY | Facility: HOSPITAL | Age: 61
Discharge: HOME | End: 2021-10-27
Attending: FAMILY MEDICINE
Payer: MEDICAID

## 2021-10-27 DIAGNOSIS — G89.29 CHRONIC PAIN OF LEFT KNEE: ICD-10-CM

## 2021-10-27 DIAGNOSIS — Z12.11 SCREENING FOR COLON CANCER: Primary | ICD-10-CM

## 2021-10-27 DIAGNOSIS — M25.562 CHRONIC PAIN OF LEFT KNEE: ICD-10-CM

## 2021-10-27 PROCEDURE — 73564 XR KNEE ORTHO LEFT WITH FLEXION: ICD-10-PCS | Mod: 26,LT,, | Performed by: RADIOLOGY

## 2021-10-27 PROCEDURE — 73564 X-RAY EXAM KNEE 4 OR MORE: CPT | Mod: TC,PN,LT

## 2021-10-27 PROCEDURE — 73562 X-RAY EXAM OF KNEE 3: CPT | Mod: 26,RT,, | Performed by: RADIOLOGY

## 2021-10-27 PROCEDURE — 73564 X-RAY EXAM KNEE 4 OR MORE: CPT | Mod: 26,LT,, | Performed by: RADIOLOGY

## 2021-10-27 PROCEDURE — 73562 XR KNEE ORTHO LEFT WITH FLEXION: ICD-10-PCS | Mod: 26,RT,, | Performed by: RADIOLOGY

## 2021-12-01 ENCOUNTER — PATIENT OUTREACH (OUTPATIENT)
Dept: ADMINISTRATIVE | Facility: OTHER | Age: 61
End: 2021-12-01
Payer: MEDICAID

## 2022-02-02 DIAGNOSIS — E11.9 TYPE 2 DIABETES MELLITUS WITHOUT COMPLICATION: ICD-10-CM

## 2022-08-19 ENCOUNTER — HOSPITAL ENCOUNTER (EMERGENCY)
Facility: OTHER | Age: 62
Discharge: HOME | End: 2022-08-19
Attending: EMERGENCY MEDICINE
Payer: MEDICAID

## 2022-08-19 VITALS
WEIGHT: 182 LBS | BODY MASS INDEX: 24.65 KG/M2 | RESPIRATION RATE: 18 BRPM | DIASTOLIC BLOOD PRESSURE: 63 MMHG | OXYGEN SATURATION: 98 % | SYSTOLIC BLOOD PRESSURE: 135 MMHG | HEART RATE: 62 BPM | TEMPERATURE: 98 F | HEIGHT: 72 IN

## 2022-08-19 DIAGNOSIS — L84 CALLUS: ICD-10-CM

## 2022-08-19 DIAGNOSIS — B07.9 VIRAL WARTS, UNSPECIFIED TYPE: Primary | ICD-10-CM

## 2022-08-19 PROCEDURE — 99283 EMERGENCY DEPT VISIT LOW MDM: CPT | Mod: 25

## 2022-08-19 NOTE — ED TRIAGE NOTES
"Pt presents to the ED w/ c/o callus on hands x 2 weeks. Pt also reporting callus on foot. Pt stating "these have been here for years."  "

## 2022-08-19 NOTE — DISCHARGE INSTRUCTIONS
Mr. Farrell,    Thank you for letting me care for you today! It was nice meeting you, and I hope you feel better soon.   If you would like access to your chart and what was done today please utilize the [facility].   Please come back to [facility] for all of your future medical needs.    Our goal in the emergency department is to always give you outstanding care and exceptional service. You may receive a survey by mail or e-mail in the next week regarding your experience in our ED. We would greatly appreciate you completing and returning the survey. Your feedback provides us with a way to recognize our staff who give very good care and it helps us learn how to improve when your experience was below our aspiration of excellence.     Sincerely,    Tom De La Torre MD  Board Certified Emergency Physician

## 2022-08-19 NOTE — ED PROVIDER NOTES
Encounter Date: 8/19/2022    SCRIBE #1 NOTE: I, Carol Ann Herbert, am scribing for, and in the presence of, Tom De La Torre MD.       History     Chief Complaint   Patient presents with    sore callus     Pt c.o callus on hands being sore onset 2 weeks. Pt states they have been present for years.  Pt also states he has one on foot.  AAO x 3 nadn   pt has what appears to be hardened skin/callus on various fingers right and left. No signs of infection      Time seen by provider: 1:00 PM    This is a 62 y.o. male with PMHx of HTN, CAD, DM, PVD, HLD and previous MI, who presents with complaint of calluses to the bilateral hand and foot that worsened 2 weeks ago, but have been present for the past year.  He notes that he was incarcerated, had the PICC gotten frequently it would have small rehan stuck in the hands on occasion which he thinks may have started the issues.  He has not used anything to try and make this any better other than feeling it using nail trimmers on the hand but it came back.   Patient's daily medications include isosorbide, synthroid, lisinopril, metformin, metoprolol and rosuvastatin.     This is the extent of the patient's complaints at this time.    The history is provided by the patient.     Review of patient's allergies indicates:   Allergen Reactions    Anesthetics - amide type - select amino amides Other (See Comments)     REPOTS ALLERGY TO UNKNOWN GENERAL ANESTHETIC, REPORTS WAS INTUBATED  REPOTS ALLERGY TO UNKNOWN GENERAL ANESTHETIC, REPORTS WAS INTUBATED       Past Medical History:   Diagnosis Date    Benign prostatic hyperplasia 7/26/2019    BPH (benign prostatic hyperplasia)     CAD (coronary artery disease) 3/9/2015    Chest pain on exertion 4/20/2015    Claudication, class II 12/22/2015    Left sided.    Diabetes mellitus type II, controlled 11/21/2019    Hemoptysis 12/12/2014    HTN (hypertension) 3/9/2015    Hyperlipidemia LDL goal <70 1/27/2020    Hypokalemia 1/27/2020     Hypothyroidism, adult 1/27/2020    Laryngopharyngeal reflux (LPR) 12/12/2014    Microscopic hematuria 7/26/2019    Nocturia 7/26/2019    NSTEMI (non-ST elevated myocardial infarction) 3/9/2015    Perforation of tympanic membrane 5/3/2013    dx update dx update    Prostate cancer screening 7/26/2019    PVD (peripheral vascular disease) 1/27/2020     No past surgical history on file.  No family history on file.  Social History     Tobacco Use    Smoking status: Never Smoker    Smokeless tobacco: Former User   Substance Use Topics    Alcohol use: Never    Drug use: Never     Review of Systems  Constitutional-no fever  HEENT-no congestion  Eyes-no redness  Respiratory-no shortness of breath  Cardio-no chest pain  GI-no abdominal pain  Endocrine-no cold intolerance  -no difficulty urinating  MSK-no myalgias  Skin-no rashes. +calluses to bilateral hand and foot.  Allergy-no environmental allergy  Neurologic-, no headache  Hematology-no swollen nodes  Behavioral-no confusion    Physical Exam     Initial Vitals [08/19/22 1231]   BP Pulse Resp Temp SpO2   135/63 62 18 98 °F (36.7 °C) 98 %      MAP       --         Physical Exam  Constitutional: Well appearing, no distress.  Eyes: Conjunctivae normal.  ENT       Head: Normocephalic, atraumatic.       Nose: Normal external appearance        Mouth/Throat: no strigulous respirations   Hematological/Lymphatic/Immunilogical: no visible lymphadenopathy   Cardiovascular: Normal rate,   Respiratory: Normal respiratory effort.   Gastrointestinal: non distended   Musculoskeletal: Normal range of motion in all extremities. No obvious deformities or swelling.  Neurologic: Alert, oriented. Normal speech and language. No gross focal neurologic deficits are appreciated.  Skin:  Or the palmar aspect on the right hand are 2 circular thickened lesions on the 2nd and 4th digit, on the left palmar surface over the flexor surface of the 3rd digit is a flat heart and thickened  patch of skin, over the base of the right great toe is skin thickening darkening with slight scaling  Psychiatric: Mood and affect are normal.   ED Course   Procedures  Labs Reviewed - No data to display       Imaging Results    None          Medications - No data to display  Medical Decision Making:   History:   Old Medical Records: I decided to obtain old medical records.  Old Records Summarized: records from clinic visits.  Differential Diagnosis:   Shingles, cellulitis, monkeypox, calluses, warts  ED Management:  Hard skin consistent with likely verrucous changes over the hands.    Plan will be for him to undergo symptomatic treatment and encouraged follow-up in the outpatient setting.  No evidence at this time to suggest significant emergent medical condition.  Vital signs reassuring otherwise well-appearing.          Scribe Attestation:   Scribe #1: I performed the above scribed service and the documentation accurately describes the services I performed. I attest to the accuracy of the note.              Physician Attestation for Scribe: I, tom de la torre, reviewed documentation as scribed in my presence, which is both accurate and complete.      Clinical Impression:   Final diagnoses:  [B07.9] Viral warts, unspecified type (Primary)  [L84] Callus          ED Disposition Condition    Discharge Stable        ED Prescriptions     Medication Sig Dispense Start Date End Date Auth. Provider    salicylic acid 10 % Crea Apply 1 g topically 2 (two) times a day. 227 g 8/19/2022  Tom De La Torre MD        Follow-up Information     Follow up With Specialties Details Why Contact Info    Alvino Ennis MD Family Medicine Call in 1 day If symptoms worsen, For a follow up visit about today [address]  [facility] [address]  [phone]             Tom De La Torre MD  08/19/22 2651

## 2022-10-10 ENCOUNTER — PATIENT OUTREACH (OUTPATIENT)
Dept: ADMINISTRATIVE | Facility: HOSPITAL | Age: 62
End: 2022-10-10
Payer: MEDICAID

## 2022-10-28 ENCOUNTER — HOSPITAL ENCOUNTER (OUTPATIENT)
Facility: HOSPITAL | Age: 62
Discharge: HOME | End: 2022-10-29
Attending: EMERGENCY MEDICINE | Admitting: INTERNAL MEDICINE
Payer: MEDICAID

## 2022-10-28 DIAGNOSIS — I25.10 CORONARY ARTERY DISEASE INVOLVING NATIVE CORONARY ARTERY OF NATIVE HEART WITHOUT ANGINA PECTORIS: Primary | ICD-10-CM

## 2022-10-28 DIAGNOSIS — I25.2 HISTORY OF NON-ST ELEVATION MYOCARDIAL INFARCTION (NSTEMI): ICD-10-CM

## 2022-10-28 DIAGNOSIS — I25.10 CORONARY ARTERY DISEASE, ANGINA PRESENCE UNSPECIFIED, UNSPECIFIED VESSEL OR LESION TYPE, UNSPECIFIED WHETHER NATIVE OR TRANSPLANTED HEART: ICD-10-CM

## 2022-10-28 DIAGNOSIS — R07.9 CHEST PAIN: ICD-10-CM

## 2022-10-28 DIAGNOSIS — R55 NEAR SYNCOPE: ICD-10-CM

## 2022-10-28 DIAGNOSIS — I21.4 NSTEMI (NON-ST ELEVATED MYOCARDIAL INFARCTION): ICD-10-CM

## 2022-10-28 PROCEDURE — 25000003 PHARM REV CODE 250: Performed by: EMERGENCY MEDICINE

## 2022-10-28 PROCEDURE — 93010 EKG 12-LEAD: ICD-10-PCS | Mod: ,,, | Performed by: INTERNAL MEDICINE

## 2022-10-28 PROCEDURE — 93010 ELECTROCARDIOGRAM REPORT: CPT | Mod: ,,, | Performed by: INTERNAL MEDICINE

## 2022-10-28 PROCEDURE — 96360 HYDRATION IV INFUSION INIT: CPT

## 2022-10-28 PROCEDURE — 99285 EMERGENCY DEPT VISIT HI MDM: CPT | Mod: 25

## 2022-10-28 PROCEDURE — 99284 EMERGENCY DEPT VISIT MOD MDM: CPT | Mod: ,,, | Performed by: EMERGENCY MEDICINE

## 2022-10-28 PROCEDURE — 93005 ELECTROCARDIOGRAM TRACING: CPT

## 2022-10-28 PROCEDURE — 99284 PR EMERGENCY DEPT VISIT,LEVEL IV: ICD-10-PCS | Mod: ,,, | Performed by: EMERGENCY MEDICINE

## 2022-10-28 RX ADMIN — SODIUM CHLORIDE 500 ML: 0.9 INJECTION, SOLUTION INTRAVENOUS at 11:10

## 2022-10-29 VITALS
HEIGHT: 72 IN | RESPIRATION RATE: 18 BRPM | WEIGHT: 182 LBS | TEMPERATURE: 98 F | OXYGEN SATURATION: 97 % | DIASTOLIC BLOOD PRESSURE: 82 MMHG | HEART RATE: 53 BPM | SYSTOLIC BLOOD PRESSURE: 150 MMHG | BODY MASS INDEX: 24.65 KG/M2

## 2022-10-29 PROBLEM — R07.9 CHEST PAIN: Status: ACTIVE | Noted: 2022-10-29

## 2022-10-29 PROBLEM — R32 UNSPECIFIED URINARY INCONTINENCE: Status: ACTIVE | Noted: 2022-10-29

## 2022-10-29 PROBLEM — R55 NEAR SYNCOPE: Status: ACTIVE | Noted: 2022-10-29

## 2022-10-29 LAB
ALBUMIN SERPL BCP-MCNC: 3.3 G/DL (ref 3.5–5.2)
ALP SERPL-CCNC: 65 U/L (ref 55–135)
ALT SERPL W/O P-5'-P-CCNC: 15 U/L (ref 10–44)
ANION GAP SERPL CALC-SCNC: 12 MMOL/L (ref 8–16)
ASCENDING AORTA: 2.61 CM
AST SERPL-CCNC: 26 U/L (ref 10–40)
AV INDEX (PROSTH): 0.77
AV MEAN GRADIENT: 2 MMHG
AV PEAK GRADIENT: 5 MMHG
AV VALVE AREA: 3.11 CM2
AV VELOCITY RATIO: 0.87
BASOPHILS # BLD AUTO: 0.03 K/UL (ref 0–0.2)
BASOPHILS NFR BLD: 0.4 % (ref 0–1.9)
BILIRUB SERPL-MCNC: 0.4 MG/DL (ref 0.1–1)
BILIRUB UR QL STRIP: NEGATIVE
BNP SERPL-MCNC: <10 PG/ML (ref 0–99)
BSA FOR ECHO PROCEDURE: 2.05 M2
BUN SERPL-MCNC: 17 MG/DL (ref 6–30)
BUN SERPL-MCNC: 17 MG/DL (ref 8–23)
CALCIUM SERPL-MCNC: 8.1 MG/DL (ref 8.7–10.5)
CHLORIDE SERPL-SCNC: 102 MMOL/L (ref 95–110)
CHLORIDE SERPL-SCNC: 107 MMOL/L (ref 95–110)
CLARITY UR REFRACT.AUTO: CLEAR
CO2 SERPL-SCNC: 19 MMOL/L (ref 23–29)
COLOR UR AUTO: YELLOW
CREAT SERPL-MCNC: 0.9 MG/DL (ref 0.5–1.4)
CREAT SERPL-MCNC: 0.9 MG/DL (ref 0.5–1.4)
CV ECHO LV RWT: 0.3 CM
DIFFERENTIAL METHOD: ABNORMAL
DOP CALC AO PEAK VEL: 1.12 M/S
DOP CALC AO VTI: 24.35 CM
DOP CALC LVOT AREA: 4 CM2
DOP CALC LVOT DIAMETER: 2.27 CM
DOP CALC LVOT PEAK VEL: 0.97 M/S
DOP CALC LVOT STROKE VOLUME: 75.68 CM3
DOP CALCLVOT PEAK VEL VTI: 18.71 CM
E WAVE DECELERATION TIME: 203.02 MSEC
E/A RATIO: 1.15
E/E' RATIO: 7.64 M/S
ECHO LV POSTERIOR WALL: 0.79 CM (ref 0.6–1.1)
EJECTION FRACTION: 55 %
EOSINOPHIL # BLD AUTO: 0 K/UL (ref 0–0.5)
EOSINOPHIL NFR BLD: 0.6 % (ref 0–8)
ERYTHROCYTE [DISTWIDTH] IN BLOOD BY AUTOMATED COUNT: 13.1 % (ref 11.5–14.5)
EST. GFR  (NO RACE VARIABLE): >60 ML/MIN/1.73 M^2
ETHANOL SERPL-MCNC: <10 MG/DL
FRACTIONAL SHORTENING: 29 % (ref 28–44)
GLUCOSE SERPL-MCNC: 116 MG/DL (ref 70–110)
GLUCOSE SERPL-MCNC: 130 MG/DL (ref 70–110)
GLUCOSE UR QL STRIP: NEGATIVE
HCT VFR BLD AUTO: 33.8 % (ref 40–54)
HCT VFR BLD CALC: 35 %PCV (ref 36–54)
HCV AB SERPL QL IA: NORMAL
HGB BLD-MCNC: 11.5 G/DL (ref 14–18)
HGB UR QL STRIP: ABNORMAL
HIV 1+2 AB+HIV1 P24 AG SERPL QL IA: NORMAL
IMM GRANULOCYTES # BLD AUTO: 0.02 K/UL (ref 0–0.04)
IMM GRANULOCYTES NFR BLD AUTO: 0.3 % (ref 0–0.5)
INTERVENTRICULAR SEPTUM: 0.69 CM (ref 0.6–1.1)
IVRT: 105.61 MSEC
KETONES UR QL STRIP: NEGATIVE
LA MAJOR: 5.02 CM
LA MINOR: 5.23 CM
LA WIDTH: 4.24 CM
LEFT ATRIUM SIZE: 3.01 CM
LEFT ATRIUM VOLUME INDEX MOD: 31.8 ML/M2
LEFT ATRIUM VOLUME INDEX: 27.1 ML/M2
LEFT ATRIUM VOLUME MOD: 65.29 CM3
LEFT ATRIUM VOLUME: 55.57 CM3
LEFT INTERNAL DIMENSION IN SYSTOLE: 3.78 CM (ref 2.1–4)
LEFT VENTRICLE DIASTOLIC VOLUME INDEX: 65.95 ML/M2
LEFT VENTRICLE DIASTOLIC VOLUME: 135.2 ML
LEFT VENTRICLE MASS INDEX: 66 G/M2
LEFT VENTRICLE SYSTOLIC VOLUME INDEX: 29.8 ML/M2
LEFT VENTRICLE SYSTOLIC VOLUME: 61.02 ML
LEFT VENTRICULAR INTERNAL DIMENSION IN DIASTOLE: 5.3 CM (ref 3.5–6)
LEFT VENTRICULAR MASS: 136.04 G
LEUKOCYTE ESTERASE UR QL STRIP: NEGATIVE
LV LATERAL E/E' RATIO: 6 M/S
LV SEPTAL E/E' RATIO: 10.5 M/S
LYMPHOCYTES # BLD AUTO: 1.2 K/UL (ref 1–4.8)
LYMPHOCYTES NFR BLD: 16.8 % (ref 18–48)
MAGNESIUM SERPL-MCNC: 1.8 MG/DL (ref 1.6–2.6)
MCH RBC QN AUTO: 32.3 PG (ref 27–31)
MCHC RBC AUTO-ENTMCNC: 34 G/DL (ref 32–36)
MCV RBC AUTO: 95 FL (ref 82–98)
MONOCYTES # BLD AUTO: 0.6 K/UL (ref 0.3–1)
MONOCYTES NFR BLD: 8.7 % (ref 4–15)
MV A" WAVE DURATION": 9.71 MSEC
MV PEAK A VEL: 0.73 M/S
MV PEAK E VEL: 0.84 M/S
MV STENOSIS PRESSURE HALF TIME: 58.87 MS
MV VALVE AREA P 1/2 METHOD: 3.74 CM2
NEUTROPHILS # BLD AUTO: 5.3 K/UL (ref 1.8–7.7)
NEUTROPHILS NFR BLD: 73.2 % (ref 38–73)
NITRITE UR QL STRIP: NEGATIVE
NRBC BLD-RTO: 0 /100 WBC
PH UR STRIP: 6 [PH] (ref 5–8)
PISA TR MAX VEL: 1.7 M/S
PLATELET # BLD AUTO: 177 K/UL (ref 150–450)
PMV BLD AUTO: 9.5 FL (ref 9.2–12.9)
POC IONIZED CALCIUM: 1.14 MMOL/L (ref 1.06–1.42)
POC TCO2 (MEASURED): 25 MMOL/L (ref 23–29)
POCT GLUCOSE: 84 MG/DL (ref 70–110)
POTASSIUM BLD-SCNC: 3.4 MMOL/L (ref 3.5–5.1)
POTASSIUM SERPL-SCNC: 3.7 MMOL/L (ref 3.5–5.1)
PROT SERPL-MCNC: 6.4 G/DL (ref 6–8.4)
PROT UR QL STRIP: NEGATIVE
PULM VEIN S/D RATIO: 1.82
PV PEAK D VEL: 0.38 M/S
PV PEAK S VEL: 0.69 M/S
RA MAJOR: 4.5 CM
RA PRESSURE: 8 MMHG
RA WIDTH: 4.17 CM
RBC # BLD AUTO: 3.56 M/UL (ref 4.6–6.2)
RV TISSUE DOPPLER FREE WALL SYSTOLIC VELOCITY 1 (APICAL 4 CHAMBER VIEW): 13.19 CM/S
SAMPLE: ABNORMAL
SINUS: 3.2 CM
SODIUM BLD-SCNC: 139 MMOL/L (ref 136–145)
SODIUM SERPL-SCNC: 138 MMOL/L (ref 136–145)
SP GR UR STRIP: 1.01 (ref 1–1.03)
STJ: 2.65 CM
TDI LATERAL: 0.14 M/S
TDI SEPTAL: 0.08 M/S
TDI: 0.11 M/S
TR MAX PG: 12 MMHG
TRICUSPID ANNULAR PLANE SYSTOLIC EXCURSION: 2.98 CM
TROPONIN I SERPL DL<=0.01 NG/ML-MCNC: 0.05 NG/ML (ref 0–0.03)
TROPONIN I SERPL DL<=0.01 NG/ML-MCNC: 0.06 NG/ML (ref 0–0.03)
TROPONIN I SERPL DL<=0.01 NG/ML-MCNC: 0.08 NG/ML (ref 0–0.03)
TSH SERPL DL<=0.005 MIU/L-ACNC: 2.95 UIU/ML (ref 0.4–4)
TV REST PULMONARY ARTERY PRESSURE: 20 MMHG
URN SPEC COLLECT METH UR: ABNORMAL
WBC # BLD AUTO: 7.22 K/UL (ref 3.9–12.7)

## 2022-10-29 PROCEDURE — 86803 HEPATITIS C AB TEST: CPT | Performed by: PHYSICIAN ASSISTANT

## 2022-10-29 PROCEDURE — 84484 ASSAY OF TROPONIN QUANT: CPT | Performed by: EMERGENCY MEDICINE

## 2022-10-29 PROCEDURE — 99220 PR INITIAL OBSERVATION CARE,LEVL III: ICD-10-PCS | Mod: ,,,

## 2022-10-29 PROCEDURE — 96361 HYDRATE IV INFUSION ADD-ON: CPT

## 2022-10-29 PROCEDURE — 25000003 PHARM REV CODE 250

## 2022-10-29 PROCEDURE — 81003 URINALYSIS AUTO W/O SCOPE: CPT | Mod: 59

## 2022-10-29 PROCEDURE — 83880 ASSAY OF NATRIURETIC PEPTIDE: CPT | Performed by: EMERGENCY MEDICINE

## 2022-10-29 PROCEDURE — 36415 COLL VENOUS BLD VENIPUNCTURE: CPT

## 2022-10-29 PROCEDURE — 99220 PR INITIAL OBSERVATION CARE,LEVL III: CPT | Mod: ,,,

## 2022-10-29 PROCEDURE — 87389 HIV-1 AG W/HIV-1&-2 AB AG IA: CPT | Performed by: PHYSICIAN ASSISTANT

## 2022-10-29 PROCEDURE — 85025 COMPLETE CBC W/AUTO DIFF WBC: CPT | Performed by: EMERGENCY MEDICINE

## 2022-10-29 PROCEDURE — 84484 ASSAY OF TROPONIN QUANT: CPT | Mod: 91

## 2022-10-29 PROCEDURE — G0378 HOSPITAL OBSERVATION PER HR: HCPCS

## 2022-10-29 PROCEDURE — 83735 ASSAY OF MAGNESIUM: CPT

## 2022-10-29 PROCEDURE — 82077 ASSAY SPEC XCP UR&BREATH IA: CPT | Performed by: EMERGENCY MEDICINE

## 2022-10-29 PROCEDURE — 94761 N-INVAS EAR/PLS OXIMETRY MLT: CPT

## 2022-10-29 PROCEDURE — 80053 COMPREHEN METABOLIC PANEL: CPT | Performed by: EMERGENCY MEDICINE

## 2022-10-29 PROCEDURE — 82962 GLUCOSE BLOOD TEST: CPT

## 2022-10-29 PROCEDURE — 84443 ASSAY THYROID STIM HORMONE: CPT

## 2022-10-29 RX ORDER — INSULIN ASPART 100 [IU]/ML
0-5 INJECTION, SOLUTION INTRAVENOUS; SUBCUTANEOUS
Status: DISCONTINUED | OUTPATIENT
Start: 2022-10-29 | End: 2022-10-29 | Stop reason: HOSPADM

## 2022-10-29 RX ORDER — GLUCAGON 1 MG
1 KIT INJECTION
Status: DISCONTINUED | OUTPATIENT
Start: 2022-10-29 | End: 2022-10-29 | Stop reason: HOSPADM

## 2022-10-29 RX ORDER — LISINOPRIL 10 MG/1
20 TABLET ORAL DAILY
Qty: 60 TABLET | Refills: 2 | Status: SHIPPED | OUTPATIENT
Start: 2022-10-29 | End: 2023-01-27

## 2022-10-29 RX ORDER — ASPIRIN 325 MG
325 TABLET ORAL DAILY
Status: DISCONTINUED | OUTPATIENT
Start: 2022-10-29 | End: 2022-10-29

## 2022-10-29 RX ORDER — ACETAMINOPHEN 325 MG/1
650 TABLET ORAL EVERY 4 HOURS PRN
Status: DISCONTINUED | OUTPATIENT
Start: 2022-10-29 | End: 2022-10-29 | Stop reason: HOSPADM

## 2022-10-29 RX ORDER — POTASSIUM CHLORIDE 20 MEQ/1
20 TABLET, EXTENDED RELEASE ORAL ONCE
Status: DISCONTINUED | OUTPATIENT
Start: 2022-10-29 | End: 2022-10-29 | Stop reason: HOSPADM

## 2022-10-29 RX ORDER — NITROGLYCERIN 0.4 MG/1
0.4 TABLET SUBLINGUAL EVERY 5 MIN PRN
Status: DISCONTINUED | OUTPATIENT
Start: 2022-10-29 | End: 2022-10-29 | Stop reason: HOSPADM

## 2022-10-29 RX ORDER — MAGNESIUM SULFATE HEPTAHYDRATE 40 MG/ML
2 INJECTION, SOLUTION INTRAVENOUS ONCE
Status: DISCONTINUED | OUTPATIENT
Start: 2022-10-29 | End: 2022-10-29 | Stop reason: HOSPADM

## 2022-10-29 RX ORDER — LISINOPRIL 10 MG/1
10 TABLET ORAL DAILY
Status: DISCONTINUED | OUTPATIENT
Start: 2022-10-30 | End: 2022-10-29 | Stop reason: HOSPADM

## 2022-10-29 RX ORDER — SODIUM CHLORIDE 0.9 % (FLUSH) 0.9 %
10 SYRINGE (ML) INJECTION
Status: DISCONTINUED | OUTPATIENT
Start: 2022-10-29 | End: 2022-10-29 | Stop reason: HOSPADM

## 2022-10-29 RX ORDER — ASPIRIN 325 MG
325 TABLET ORAL DAILY
Status: COMPLETED | OUTPATIENT
Start: 2022-10-29 | End: 2022-10-29

## 2022-10-29 RX ORDER — SIMETHICONE 80 MG
1 TABLET,CHEWABLE ORAL 4 TIMES DAILY PRN
Status: DISCONTINUED | OUTPATIENT
Start: 2022-10-29 | End: 2022-10-29 | Stop reason: HOSPADM

## 2022-10-29 RX ORDER — IPRATROPIUM BROMIDE AND ALBUTEROL SULFATE 2.5; .5 MG/3ML; MG/3ML
3 SOLUTION RESPIRATORY (INHALATION) EVERY 4 HOURS PRN
Status: DISCONTINUED | OUTPATIENT
Start: 2022-10-29 | End: 2022-10-29 | Stop reason: HOSPADM

## 2022-10-29 RX ORDER — IBUPROFEN 200 MG
16 TABLET ORAL
Status: DISCONTINUED | OUTPATIENT
Start: 2022-10-29 | End: 2022-10-29 | Stop reason: HOSPADM

## 2022-10-29 RX ORDER — POLYETHYLENE GLYCOL 3350 17 G/17G
17 POWDER, FOR SOLUTION ORAL DAILY
Status: DISCONTINUED | OUTPATIENT
Start: 2022-10-29 | End: 2022-10-29 | Stop reason: HOSPADM

## 2022-10-29 RX ORDER — BISACODYL 10 MG
10 SUPPOSITORY, RECTAL RECTAL DAILY PRN
Status: DISCONTINUED | OUTPATIENT
Start: 2022-10-29 | End: 2022-10-29 | Stop reason: HOSPADM

## 2022-10-29 RX ORDER — ACETAMINOPHEN 500 MG
1000 TABLET ORAL EVERY 8 HOURS PRN
Status: DISCONTINUED | OUTPATIENT
Start: 2022-10-29 | End: 2022-10-29 | Stop reason: HOSPADM

## 2022-10-29 RX ORDER — NALOXONE HCL 0.4 MG/ML
0.02 VIAL (ML) INJECTION
Status: DISCONTINUED | OUTPATIENT
Start: 2022-10-29 | End: 2022-10-29 | Stop reason: HOSPADM

## 2022-10-29 RX ORDER — LEVOTHYROXINE SODIUM 50 UG/1
50 TABLET ORAL
Status: DISCONTINUED | OUTPATIENT
Start: 2022-10-29 | End: 2022-10-29 | Stop reason: HOSPADM

## 2022-10-29 RX ORDER — PROCHLORPERAZINE EDISYLATE 5 MG/ML
5 INJECTION INTRAMUSCULAR; INTRAVENOUS EVERY 6 HOURS PRN
Status: DISCONTINUED | OUTPATIENT
Start: 2022-10-29 | End: 2022-10-29 | Stop reason: HOSPADM

## 2022-10-29 RX ORDER — SODIUM CHLORIDE 0.9 % (FLUSH) 0.9 %
5 SYRINGE (ML) INJECTION
Status: DISCONTINUED | OUTPATIENT
Start: 2022-10-29 | End: 2022-10-29 | Stop reason: HOSPADM

## 2022-10-29 RX ORDER — IBUPROFEN 200 MG
24 TABLET ORAL
Status: DISCONTINUED | OUTPATIENT
Start: 2022-10-29 | End: 2022-10-29 | Stop reason: HOSPADM

## 2022-10-29 RX ORDER — ATORVASTATIN CALCIUM 40 MG/1
80 TABLET, FILM COATED ORAL DAILY
Status: DISCONTINUED | OUTPATIENT
Start: 2022-10-29 | End: 2022-10-29 | Stop reason: HOSPADM

## 2022-10-29 RX ORDER — NITROGLYCERIN 0.4 MG/1
0.4 TABLET SUBLINGUAL EVERY 5 MIN PRN
Qty: 30 TABLET | Refills: 0 | Status: SHIPPED | OUTPATIENT
Start: 2022-10-29 | End: 2023-10-13

## 2022-10-29 RX ORDER — ASPIRIN 81 MG/1
81 TABLET ORAL DAILY
Qty: 90 TABLET | Refills: 1 | Status: SHIPPED | OUTPATIENT
Start: 2022-10-29

## 2022-10-29 RX ORDER — TALC
6 POWDER (GRAM) TOPICAL NIGHTLY PRN
Status: DISCONTINUED | OUTPATIENT
Start: 2022-10-29 | End: 2022-10-29 | Stop reason: HOSPADM

## 2022-10-29 RX ORDER — ONDANSETRON 8 MG/1
8 TABLET, ORALLY DISINTEGRATING ORAL EVERY 8 HOURS PRN
Status: DISCONTINUED | OUTPATIENT
Start: 2022-10-29 | End: 2022-10-29 | Stop reason: HOSPADM

## 2022-10-29 RX ORDER — MAG HYDROX/ALUMINUM HYD/SIMETH 200-200-20
30 SUSPENSION, ORAL (FINAL DOSE FORM) ORAL 4 TIMES DAILY PRN
Status: DISCONTINUED | OUTPATIENT
Start: 2022-10-29 | End: 2022-10-29 | Stop reason: HOSPADM

## 2022-10-29 RX ORDER — ASPIRIN 81 MG/1
81 TABLET ORAL DAILY
Status: DISCONTINUED | OUTPATIENT
Start: 2022-10-30 | End: 2022-10-29 | Stop reason: HOSPADM

## 2022-10-29 RX ORDER — TALC
6 POWDER (GRAM) TOPICAL NIGHTLY PRN
Status: DISCONTINUED | OUTPATIENT
Start: 2022-10-29 | End: 2022-10-29

## 2022-10-29 RX ADMIN — ATORVASTATIN CALCIUM 80 MG: 40 TABLET, FILM COATED ORAL at 09:10

## 2022-10-29 RX ADMIN — ASPIRIN 325 MG ORAL TABLET 325 MG: 325 PILL ORAL at 04:10

## 2022-10-29 NOTE — ASSESSMENT & PLAN NOTE
-complains of episode of urinary incontinence while experiencing chest pain/lightheadness prior to admission  -no previous episodes of incontinence   -no current concern for neurological disorder/seizures  -takes terazosin for BPH, hold for now  -UA ordered  -monitor for recurrence

## 2022-10-29 NOTE — CARE UPDATE
Due to system malfunction including the radiology fluency and Epic prelim reporting of STAT studies been down.     Images were reviewed by radiology staff Dr. Rodolfo Chung and resident oncall Franco Garcia from the scanner monitor and the prelim findings are the follow:     Chest xray: No acute finding.       Franco Garcia MD  Radiology Resident PGY- 3  [facility]   Pager: [phone]

## 2022-10-29 NOTE — ED PROVIDER NOTES
History:  Johny Farrell is a 62 y.o. male who presents to the ED with Dizziness (Near syncopal episode. Orthostatic with EMS)    Described as 62-year-old male with a history of CAD, medically managed, diabetes, hypertension, hypothyroidism, PVD presenting to the emergency department with chest pain and lightheadedness.  He reports he was sitting in an apartment watching the baseball game when he developed to distinct knocking type pains in his chest radiating with a tightness in his left neck with associated lightheadedness and blurred vision, feeling as though he was going to pass out.  He reports he felt so weak he lost control of his bladder.  No seizure-like activity known, no loss of consciousness.  He reports he felt the same when he had his prior to heart attacks, 1 in the 90s, last 1 in 2016, though has no stents.  Of note, he has been out of his medication, unknown which for the past week.  He denies any recent fevers or chills, current chest pain, shortness of breath.    Per EMS, patient was orthostatic in the field.    Review of Systems: All systems reviewed and are negative except as per history of present illness.  Constitutional: Negative for fever.  Positive for lightheadedness  HENT: Negative for congestion.    Respiratory: Negative for shortness of breath.    Cardiovascular: + for chest pain.   Gastrointestinal: Negative for abdominal pain.   Genitourinary: Negative for dysuria.   Musculoskeletal: Negative for myalgias.   Skin: Negative for rash.   Neurological: Negative for focal weakness.   Psychiatric/Behavioral: Negative for memory loss.     Medications:   Previous Medications    ASPIRIN (ECOTRIN) 81 MG EC TABLET    Take 1 tablet (81 mg total) by mouth once daily.    ISOSORBIDE MONONITRATE (IMDUR) 30 MG 24 HR TABLET    Take 30 mg by mouth once daily.    LEVOTHYROXINE (SYNTHROID) 25 MCG TABLET    Take 50 mcg by mouth.    LISINOPRIL 10 MG TABLET    Take 2 tablets (20 mg total) by mouth once  daily.    METFORMIN (GLUCOPHAGE) 500 MG TABLET    Take 500 mg by mouth 2 (two) times daily with meals.    METOPROLOL SUCCINATE (TOPROL-XL) 25 MG 24 HR TABLET    TK 1/2 T PO QD    MUPIROCIN (BACTROBAN) 2 % OINTMENT    Apply topically 3 (three) times daily.    NITROGLYCERIN (NITROSTAT) 0.4 MG SL TABLET    Place 0.4 mg under the tongue.    ROSUVASTATIN (CRESTOR) 20 MG TABLET    Take 1 tablet (20 mg total) by mouth every evening. For cholesterol    SALICYLIC ACID 10 % CREA    Apply 1 g topically 2 (two) times a day.    TERAZOSIN (HYTRIN) 2 MG CAPSULE    Take 1 capsule (2 mg total) by mouth once daily.       PMH:   Past Medical History:   Diagnosis Date    Benign prostatic hyperplasia 7/26/2019    BPH (benign prostatic hyperplasia)     CAD (coronary artery disease) 3/9/2015    Chest pain on exertion 4/20/2015    Claudication, class II 12/22/2015    Left sided.    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    dx update dx update    Prostate cancer screening 7/26/2019    PVD (peripheral vascular disease) 1/27/2020     PSH: No past surgical history on file.  Allergies: He is allergic to anesthetics - amide type - select amino amides.  Social History: Marital Status: single. He  reports that he has never smoked. He has quit using smokeless tobacco.. He  reports no history of alcohol use..       Exam:  VITAL SIGNS:   Vitals:    10/29/22 0054 10/29/22 0150 10/29/22 0232 10/29/22 0252   BP: (!) 144/67 127/68 111/62    BP Location: Right arm Right arm     Patient Position: Lying Lying     Pulse: 72 (!) 56 (!) 51    Resp: 18 16 11    Temp:    98.4 °F (36.9 °C)    TempSrc:    Oral   SpO2: 98% 99% 100%    Weight:         Const: Awake and alert, NAD   Head: Atraumatic  Eyes: Normal Conjunctiva  ENT: Normal External Ears, Nose and Mouth.  Neck: Full range of motion. No meningismus.  Resp: Normal respiratory effort, No distress, CTAB  Cardio: Equal and intact distal pulses, RRR  Abd: Soft, non tender, non distended.  Skin: No petechiae or rashes  Ext: No cyanosis, or edema  Neur: Awake and alert, GCS 15, moves all extremities equally, cranial nerves grossly intact  Psych: Normal Mood and Affect    Data:  Results for orders placed or performed during the hospital encounter of 10/28/22   HIV 1/2 Ag/Ab (4th Gen)   Result Value Ref Range    HIV 1/2 Ag/Ab Non-reactive Non-reactive   Hepatitis C Antibody   Result Value Ref Range    Hepatitis C Ab Non-reactive Non-reactive   CBC auto differential   Result Value Ref Range    WBC 7.22 3.90 - 12.70 K/uL    RBC 3.56 (L) 4.60 - 6.20 M/uL    Hemoglobin 11.5 (L) 14.0 - 18.0 g/dL    Hematocrit 33.8 (L) 40.0 - 54.0 %    MCV 95 82 - 98 fL    MCH 32.3 (H) 27.0 - 31.0 pg    MCHC 34.0 32.0 - 36.0 g/dL    RDW 13.1 11.5 - 14.5 %    Platelets 177 150 - 450 K/uL    MPV 9.5 9.2 - 12.9 fL    Immature Granulocytes 0.3 0.0 - 0.5 %    Gran # (ANC) 5.3 1.8 - 7.7 K/uL    Immature Grans (Abs) 0.02 0.00 - 0.04 K/uL    Lymph # 1.2 1.0 - 4.8 K/uL    Mono # 0.6 0.3 - 1.0 K/uL    Eos # 0.0 0.0 - 0.5 K/uL    Baso # 0.03 0.00 - 0.20 K/uL    nRBC 0 0 /100 WBC    Gran % 73.2 (H) 38.0 - 73.0 %    Lymph % 16.8 (L) 18.0 - 48.0 %    Mono % 8.7 4.0 - 15.0 %    Eosinophil % 0.6 0.0 - 8.0 %    Basophil % 0.4 0.0 - 1.9 %    Differential Method Automated    Comprehensive metabolic panel   Result Value Ref Range    Sodium 138 136 - 145 mmol/L    Potassium 3.7 3.5 - 5.1 mmol/L    Chloride 107 95 - 110 mmol/L    CO2 19 (L) 23 - 29 mmol/L    Glucose 116 (H) 70 - 110 mg/dL    BUN 17 8 - 23 mg/dL    Creatinine 0.9 0.5 - 1.4 mg/dL    Calcium 8.1 (L) 8.7 - 10.5 mg/dL    Total Protein  6.4 6.0 - 8.4 g/dL    Albumin 3.3 (L) 3.5 - 5.2 g/dL    Total Bilirubin 0.4 0.1 - 1.0 mg/dL    Alkaline Phosphatase 65 55 - 135 U/L    AST 26 10 - 40 U/L    ALT 15 10 - 44 U/L    Anion Gap 12 8 - 16 mmol/L    eGFR >60.0 >60 mL/min/1.73 m^2   Troponin I #1   Result Value Ref Range    Troponin I 0.047 (H) 0.000 - 0.026 ng/mL   B-Type natriuretic peptide (BNP)   Result Value Ref Range    BNP <10 0 - 99 pg/mL   Troponin I #2   Result Value Ref Range    Troponin I 0.076 (H) 0.000 - 0.026 ng/mL   Ethanol   Result Value Ref Range    Alcohol, Serum <10 <10 mg/dL   TSH   Result Value Ref Range    TSH 2.953 0.400 - 4.000 uIU/mL   Urinalysis, Reflex to Urine Culture Urine, Clean Catch    Specimen: Urine   Result Value Ref Range    Specimen UA Urine, Clean Catch     Color, UA Yellow Yellow, Straw, Dana    Appearance, UA Clear Clear    pH, UA 6.0 5.0 - 8.0    Specific Gravity, UA 1.010 1.005 - 1.030    Protein, UA Negative Negative    Glucose, UA Negative Negative    Ketones, UA Negative Negative    Bilirubin (UA) Negative Negative    Occult Blood UA Trace (A) Negative    Nitrite, UA Negative Negative    Leukocytes, UA Negative Negative   Magnesium   Result Value Ref Range    Magnesium 1.8 1.6 - 2.6 mg/dL   POCT glucose   Result Value Ref Range    POCT Glucose 84 70 - 110 mg/dL     Imaging Results              X-Ray Chest AP Portable (Final result)  Result time 10/29/22 02:56:30      Final result by Bradford Andrade MD (10/29/22 02:56:30)                   Impression:      No radiographic evidence of acute intrathoracic process on this single view.      Electronically signed by: Bradford Andrade MD  Date:    10/29/2022  Time:    02:56               Narrative:    EXAMINATION:  XR CHEST AP PORTABLE    CLINICAL HISTORY:  Chest Pain;    TECHNIQUE:  Single frontal view of the chest was performed.    COMPARISON:  11/19/2019    FINDINGS:  Cardiac silhouette appears within normal limits.  Lungs are symmetrically expanded without  evidence of confluent airspace consolidation.  No significant volume of pleural fluid or pneumothorax identified.  The visualized osseous structures demonstrate mild degenerative changes.                                    12-LEAD EKG INTERPRETATION BY ME:  Rate/Rhythm:  Sinus bradycardia with rate of 52 beats per minute  QRS, ST, T-waves: No changes consistent with acute ischemia  Impression:  Sinus bradycardia  Labs & Imaging studies were reviewed independently by me.     Medical Decision Makinyo M presenting with CP, feeling like his last heart attack, as well as lightheadedness. He was reportedly orthostatic en route, and was given IVF with improvement in his symptoms in the ER. Regarding his CP, this is atypical in nature, though is his CP equivalent for ACS, concerning especially given his CAD medically managed in the past and off meds x 1 week. Doubt PE, PTX, PNA, aortic dissection. Patient admitted to hospitalist medicine for ACS r/o pending all labs and XR studies.    On my review, labs show a mild elevation in  troponin, trending mildly up 0.076. He has a mild anemia, normocytic 11.5, doubt symptomatic anemia. Otherwise unremarkable. XR negative.     Clinical Impression:  1. Chest pain    2. NSTEMI (non-ST elevated myocardial infarction)                   Starr Champion MD  10/29/22 2110

## 2022-10-29 NOTE — ASSESSMENT & PLAN NOTE
"-presented with acute episode of chest pain, nausea, lightheadedness, diaphoresis. Denies LOC but states he was feeling as though he might  -Reports being told to stop imdur due to "falling" episodes in the past  -Orthostatic per EMS, reportedly negative orthostats in ED   -S/p 500 cc bolus in ED  -Repeat orthostats ordered - vitals stable.  -Echo ordered - see Chest pain  -holding home BP meds for now, resume as appropriate  -fall precautions  "

## 2022-10-29 NOTE — ASSESSMENT & PLAN NOTE
-chronic, stable  -hold home terazosin for now in setting of incontinence and low-normal blood pressure/near-syncope  -monitor for further episodes of incontinence

## 2022-10-29 NOTE — ASSESSMENT & PLAN NOTE
CAD (coronary artery disease)   NSTEMI (non-ST elevated myocardial infarction)  62 y.o. who presents with chest pain with radiation to the neck that is consistent with prior episodes of angina. Pain is not reproducible on exam. Similar symptoms to prior NSTEMI raises concern for ACS. Ran out of nitroglycerin, aspirin, lisinopril at home. Chest pain resolved at arrival to ED.       - EKG without ST-segment elevation or depression, HR 52  - Initial troponin 0.047->0.076, will continue to trend  - Nitroglycerin PRN  -  administered  - Continue ASA, statin, BB; plavix discontinued by cardiologist ~2021  - CXR no acute process, BNP <10  - NPO at midnight as precuation  - CBC, CMP (goal Mag>2, K>4) daily; lipid panel ordered  - Cardiac telemetry  - 2D ECHO ordered, reveals normal cardiac function without wall motion abnormalities (see below)  - Previous cardiac testing with EKG stress test, echo, myocardial perfusion imaging   - Risk factors: HTN, HLD, diabetes mellitus  - Discussed with on-call cardiology - recommend obtaining echo and continue to trend troponin. No need for ACS protocol at this time. If troponin rises or echo shows new WMAs, start ACS protocol w/ heparin gtt and consult interventional cardiology.   - d/c home with cardiology f/u     Results for orders placed during the hospital encounter of 10/28/22    Echo    Interpretation Summary  · The left ventricle is normal in size with normal systolic function. The estimated ejection fraction is 55-60%.  · Normal right ventricular size with normal right ventricular systolic function.  · Normal left ventricular diastolic function.  · The estimated PA systolic pressure is 20 mmHg.  · Intermediate central venous pressure (8 mmHg).  · The estimated ejection fraction is 55%.

## 2022-10-29 NOTE — ASSESSMENT & PLAN NOTE
CAD (coronary artery disease)   NSTEMI (non-ST elevated myocardial infarction)  62 y.o. who presents with chest pain with radiation to the neck that is consistent with prior episodes of angina. Pain is not reproducible on exam. Similar symptoms to prior NSTEMI raises concern for ACS. Ran out of nitroglycerin, aspirin, lisinopril at home. Chest pain resolved at arrival to ED.       - EKG without ST-segment elevation or depression, HR 52  - Initial troponin 0.047->0.076, will continue to trend  - Nitroglycerin PRN  -  administered  - Continue ASA, statin, BB; plavix discontinued by cardiologist ~2021  - CXR no acute process, BNP <10  - NPO at midnight as precuation  - CBC, CMP (goal Mag>2, K>4) daily; lipid panel ordered  - Cardiac telemetry  - 2D ECHO ordered  - Previous cardiac testing with EKG stress test, echo, myocardial perfusion imaging   - Risk factors: HTN, HLD, diabetes mellitus  - Discussed with on-call cardiology - recommend obtaining echo and continue to trend troponin. No need for ACS protocol at this time. If troponin rises or echo shows new WMAs, start ACS protocol w/ heparin gtt and consult interventional cardiology.

## 2022-10-29 NOTE — ASSESSMENT & PLAN NOTE
"-Chronic, low-normal blood pressures on admission  -Reportedly orthostatic with EMS  -complaints of possible near-syncope episode  -Was previously instructed to discontinue Imdur due to multiple "falling" events  -Has been out of lisinopril for ~I week  -Re-check orthostats in AM  -Hold home medications for now as blood pressure is low-normal with regular use of metoprolol only  -Resume as appropriate  "

## 2022-10-29 NOTE — ASSESSMENT & PLAN NOTE
"-presented with acute episode of chest pain, nausea, lightheadedness, diaphoresis. Denies LOC but states he was feeling as though he might  -Reports being told to stop imdur due to "falling" episodes in the past  -Orthostatic per EMS, reportedly negative orthostats in ED   -S/p 500 cc bolus in ED  -Repeat orthostats ordered  -Echo ordered  -holding home BP meds for now, resume as appropriate  -fall precautions  "

## 2022-10-29 NOTE — SUBJECTIVE & OBJECTIVE
Past Medical History:   Diagnosis Date    Benign prostatic hyperplasia 7/26/2019    BPH (benign prostatic hyperplasia)     CAD (coronary artery disease) 3/9/2015    Chest pain on exertion 4/20/2015    Claudication, class II 12/22/2015    Left sided.    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    dx update dx update    Prostate cancer screening 7/26/2019    PVD (peripheral vascular disease) 1/27/2020       No past surgical history on file.    Review of patient's allergies indicates:   Allergen Reactions    Anesthetics - amide type - select amino amides Other (See Comments)     REPOTS ALLERGY TO UNKNOWN GENERAL ANESTHETIC, REPORTS WAS INTUBATED  REPOTS ALLERGY TO UNKNOWN GENERAL ANESTHETIC, REPORTS WAS INTUBATED         No current facility-administered medications on file prior to encounter.     Current Outpatient Medications on File Prior to Encounter   Medication Sig    aspirin (ECOTRIN) 81 MG EC tablet Take 1 tablet (81 mg total) by mouth once daily.    isosorbide mononitrate (IMDUR) 30 MG 24 hr tablet Take 30 mg by mouth once daily.    levothyroxine (SYNTHROID) 25 MCG tablet Take 50 mcg by mouth.    lisinopriL 10 MG tablet Take 2 tablets (20 mg total) by mouth once daily.    metFORMIN (GLUCOPHAGE) 500 MG tablet Take 500 mg by mouth 2 (two) times daily with meals.    metoprolol succinate (TOPROL-XL) 25 MG 24 hr tablet TK 1/2 T PO QD    mupirocin (BACTROBAN) 2 % ointment Apply topically 3 (three) times daily.    nitroGLYCERIN (NITROSTAT) 0.4 MG SL tablet Place 0.4 mg under the tongue.    rosuvastatin (CRESTOR) 20 MG tablet Take 1 tablet (20 mg total)  by mouth every evening. For cholesterol    salicylic acid 10 % Crea Apply 1 g topically 2 (two) times a day.    terazosin (HYTRIN) 2 MG capsule Take 1 capsule (2 mg total) by mouth once daily.     Family History    None       Tobacco Use    Smoking status: Never    Smokeless tobacco: Former   Substance and Sexual Activity    Alcohol use: Never    Drug use: Never    Sexual activity: Not on file     Review of Systems   Constitutional:  Positive for diaphoresis. Negative for activity change, chills and fever.   HENT:  Negative for trouble swallowing.    Eyes:  Positive for visual disturbance (intermittently). Negative for photophobia.   Respiratory:  Negative for cough, chest tightness, shortness of breath and wheezing.    Cardiovascular:  Positive for chest pain. Negative for palpitations and leg swelling.   Gastrointestinal:  Positive for nausea. Negative for abdominal pain, constipation, diarrhea and vomiting.   Genitourinary:  Negative for dysuria, frequency, hematuria and urgency.        One episode of bladder incontinence    Musculoskeletal:  Positive for gait problem (chronic). Negative for back pain, joint swelling and neck stiffness.   Skin:  Negative for color change and rash.   Neurological:  Positive for light-headedness. Negative for dizziness, seizures, syncope, numbness and headaches.   Psychiatric/Behavioral:  Negative for agitation and confusion. The patient is not nervous/anxious.    Objective:     Vital Signs (Most Recent):  Temp: 98.4 °F (36.9 °C) (10/29/22 0252)  Pulse: (!) 51 (10/29/22 0232)  Resp: 11 (10/29/22 0232)  BP: 111/62 (10/29/22 0232)  SpO2: 100 % (10/29/22 0232)   Vital Signs (24h Range):  Temp:  [97.9 °F (36.6 °C)-98.4 °F (36.9 °C)] 98.4 °F (36.9 °C)  Pulse:  [51-72] 51  Resp:  [11-19] 11  SpO2:  [98 %-100 %] 100 %  BP: (111-144)/(56-70) 111/62     Weight: 82.6 kg (182 lb)  Body mass index is 24.68 kg/m².    Physical Exam  Vitals and nursing note reviewed.   Constitutional:        General: He is not in acute distress.     Appearance: He is well-developed. He is not diaphoretic.   HENT:      Head: Normocephalic and atraumatic.      Mouth/Throat:      Pharynx: No oropharyngeal exudate.   Eyes:      Extraocular Movements: Extraocular movements intact.      Conjunctiva/sclera: Conjunctivae normal.      Pupils: Pupils are equal, round, and reactive to light.   Cardiovascular:      Rate and Rhythm: Normal rate and regular rhythm.      Heart sounds: Normal heart sounds.   Pulmonary:      Effort: Pulmonary effort is normal. No respiratory distress.      Breath sounds: Normal breath sounds. No wheezing.   Abdominal:      General: Bowel sounds are normal. There is no distension.      Palpations: Abdomen is soft.      Tenderness: There is no abdominal tenderness.   Musculoskeletal:         General: No tenderness. Normal range of motion.      Cervical back: Normal range of motion and neck supple.      Right lower leg: No edema.      Left lower leg: No edema.   Lymphadenopathy:      Cervical: No cervical adenopathy.   Skin:     General: Skin is warm and dry.      Capillary Refill: Capillary refill takes less than 2 seconds.      Findings: No rash.   Neurological:      Mental Status: He is alert and oriented to person, place, and time.      Cranial Nerves: No cranial nerve deficit.      Sensory: No sensory deficit.      Coordination: Coordination normal.   Psychiatric:         Behavior: Behavior normal.         Thought Content: Thought content normal.         Judgment: Judgment normal.         CRANIAL NERVES     CN III, IV, VI   Pupils are equal, round, and reactive to light.     Significant Labs: All pertinent labs within the past 24 hours have been reviewed.  CBC:   Recent Labs   Lab 10/29/22  0139   WBC 7.22   HGB 11.5*   HCT 33.8*        CMP:   Recent Labs   Lab 10/29/22  0139      K 3.7      CO2 19*   *   BUN 17   CREATININE 0.9   CALCIUM 8.1*   PROT 6.4   ALBUMIN 3.3*    BILITOT 0.4   ALKPHOS 65   AST 26   ALT 15   ANIONGAP 12     Cardiac Markers:   Recent Labs   Lab 10/29/22  0139   BNP <10     Troponin:   Recent Labs   Lab 10/29/22  0139 10/29/22  0250   TROPONINI 0.047* 0.076*     TSH:   Recent Labs   Lab 10/29/22  0250   TSH 2.953       Significant Imaging: I have reviewed all pertinent imaging results/findings within the past 24 hours.  X-Ray Chest AP Portable  Narrative: EXAMINATION:  XR CHEST AP PORTABLE    CLINICAL HISTORY:  Chest Pain;    TECHNIQUE:  Single frontal view of the chest was performed.    COMPARISON:  11/19/2019    FINDINGS:  Cardiac silhouette appears within normal limits.  Lungs are symmetrically expanded without evidence of confluent airspace consolidation.  No significant volume of pleural fluid or pneumothorax identified.  The visualized osseous structures demonstrate mild degenerative changes.  Impression: No radiographic evidence of acute intrathoracic process on this single view.    Electronically signed by: Bradford Andrade MD  Date:    10/29/2022  Time:    02:56

## 2022-10-29 NOTE — HPI
Johny Farrell  is a 62-year-old male with a history of CAD, diabetes, hypertension, hypothyroidism, PVD admitted to hospital medicine with chest pain and lightheadedness.  He reports he was sitting in an apartment watching TV when he developed a distinct knocking type pain in his chest radiating with a tightness in his left neck with associated diaphoresis, nausea, lightheadedness and blurred vision. He states he felt as though he was going to pass out.  He then suddenly experienced bladder incontinence and rushed to the toilet because he felt as though he was going to lose bowel continence as well- though never did. Has never experienced bladder incontinence previously, no history of seizures and no seizure-like activity noted by witnesses. No LOC.  He called EMS because the chest pain episode was similar to previous heart attacks reportedly in the 90's and in 2016. He has never had stents placed and was told he did not need them previously. Denies current chest pain and states it dissipated for the most part prior to arrival of EMS. No aggravating or alleviating factors. He has been out of his nitroglycerin, aspirin, and lisinopril for ~1 week. He has social barriers that make obtaining medications difficult. He was told his BP was low after experiencing multiple falls in the past and Imdur was discontinued. Denies smoking tobacco, ETOH use. Denies any recent fevers or chills, syncope, shortness of breath, dizziness, palpitations, headache, dysuria, back or leg pain, or saddle anesthesia. Ambulates with a cane for chronic left knee stiffness/pain. Per EMS, patient was orthostatic in the field.    In the ED, VSS on RA satting 100%. HR 50s-70s. Afebrile no leukocytosis. H/H 11.5/33.8. Bicarb 19, Cr 0.9 at baseline. BNP <10, Troponin 0.047-->0.076. EKG sinus bradycardia. CXR no acute process. Given 500 cc NS bolus.

## 2022-10-29 NOTE — ED NOTES
I-STAT Chem-8+ Results:   Value Reference Range   Sodium 139 136-145 mmol/L   Potassium  3.4 3.5-5.1 mmol/L   Chloride 102  mmol/L   Ionized Calcium 1.14 1.06-1.42 mmol/L   CO2 (measured) 25 23-29 mmol/L   Glucose 130  mg/dL   BUN 17 6-30 mg/dL   Creatinine 0.9 0.5-1.4 mg/dL   Hematocrit 35 36-54%

## 2022-10-29 NOTE — DISCHARGE SUMMARY
[facility]ation [address] [place] [address] Medicine  Discharge Summary      Patient Name: Johny Farrell  MRN: [id number]  Patient Class: OP- Observation  Admission Date: 10/28/2022  Hospital Length of Stay: 0 days  Discharge Date and Time:  10/29/2022 3:47 PM  Attending Physician: Lucio Turpin MD   Discharging Provider: Yesica Andujar PA-C  Primary Care Provider: Alvino Ennis MD  [facility] Medicine Team: [facility] HOSP MED E Yesica Andujar PA-C    HPI:   Johny Farrell  is a 62-year-old male with a history of CAD, diabetes, hypertension, hypothyroidism, PVD admitted to hospital medicine with chest pain and lightheadedness.  He reports he was sitting in an apartment watching TV when he developed a distinct knocking type pain in his chest radiating with a tightness in his left neck with associated diaphoresis, nausea, lightheadedness and blurred vision. He states he felt as though he was going to pass out.  He then suddenly experienced bladder incontinence and rushed to the toilet because he felt as though he was going to lose bowel continence as well- though never did. Has never experienced bladder incontinence previously, no history of seizures and no seizure-like activity noted by witnesses. No LOC.  He called EMS because the chest pain episode was similar to previous heart attacks reportedly in the 90's and in 2016. He has never had stents placed and was told he did not need them previously. Denies current chest pain and states it dissipated for the most part prior to arrival of EMS. No aggravating or alleviating factors. He has been out of his nitroglycerin, aspirin, and lisinopril for ~1 week. He has social barriers that make obtaining medications difficult. He was told his BP was low after experiencing multiple falls in the past and Imdur was discontinued. Denies smoking tobacco, ETOH use. Denies any recent fevers or chills, syncope, shortness of breath, dizziness, palpitations, headache, dysuria, back or  leg pain, or saddle anesthesia. Ambulates with a cane for chronic left knee stiffness/pain. Per EMS, patient was orthostatic in the field.    In the ED, VSS on RA satting 100%. HR 50s-70s. Afebrile no leukocytosis. H/H 11.5/33.8. Bicarb 19, Cr 0.9 at baseline. BNP <10, Troponin 0.047-->0.076. EKG sinus bradycardia. CXR no acute process. Given 500 cc NS bolus.       * No surgery found *      Hospital Course:   Johny Farrell is placed in  observation for chest pain/ ACS rule out. EKG without ischemia. Trop peaked 0.047 -> 0.076 -> 0.055. No recurrence of chest pain. Echo revealed normal cardiac function, no wall motion abnormalities. Dc home with PCP follow up.        Goals of Care Treatment Preferences:  Code Status: Full Code      Consults:     * Chest pain  CAD (coronary artery disease)   NSTEMI (non-ST elevated myocardial infarction)  62 y.o. who presents with chest pain with radiation to the neck that is consistent with prior episodes of angina. Pain is not reproducible on exam. Similar symptoms to prior NSTEMI raises concern for ACS. Ran out of nitroglycerin, aspirin, lisinopril at home. Chest pain resolved at arrival to ED.       - EKG without ST-segment elevation or depression, HR 52  - Initial troponin 0.047->0.076, will continue to trend  - Nitroglycerin PRN  -  administered  - Continue ASA, statin, BB; plavix discontinued by cardiologist ~2021  - CXR no acute process, BNP <10  - NPO at midnight as precuation  - CBC, CMP (goal Mag>2, K>4) daily; lipid panel ordered  - Cardiac telemetry  - 2D ECHO ordered, reveals normal cardiac function without wall motion abnormalities (see below)  - Previous cardiac testing with EKG stress test, echo, myocardial perfusion imaging   - Risk factors: HTN, HLD, diabetes mellitus  - Discussed with on-call cardiology - recommend obtaining echo and continue to trend troponin. No need for ACS protocol at this time. If troponin rises or echo shows new WMAs, start ACS  "protocol w/ heparin gtt and consult interventional cardiology.   - d/c home with cardiology f/u     Results for orders placed during the hospital encounter of 10/28/22    Echo    Interpretation Summary  · The left ventricle is normal in size with normal systolic function. The estimated ejection fraction is 55-60%.  · Normal right ventricular size with normal right ventricular systolic function.  · Normal left ventricular diastolic function.  · The estimated PA systolic pressure is 20 mmHg.  · Intermediate central venous pressure (8 mmHg).  · The estimated ejection fraction is 55%.      Near syncope  -presented with acute episode of chest pain, nausea, lightheadedness, diaphoresis. Denies LOC but states he was feeling as though he might  -Reports being told to stop imdur due to "falling" episodes in the past  -Orthostatic per EMS, reportedly negative orthostats in ED   -S/p 500 cc bolus in ED  -Repeat orthostats ordered - vitals stable.  -Echo ordered - see Chest pain  -holding home BP meds for now, resume as appropriate  -fall precautions    Type II diabetes mellitus with peripheral circulatory disorder  Patient's FSGs are controlled on current medication regimen.  Last A1c reviewed-   Lab Results   Component Value Date    LABA1C 7.6 (H) 11/20/2019    HGBA1C 5.2 07/19/2021     Most recent fingerstick glucose reviewed-   Recent Labs   Lab 10/29/22  0417   POCTGLUCOSE 84     Current correctional scale  Low  Maintain anti-hyperglycemic dose as follows-   Antihyperglycemics (From admission, onward)    Start     Stop Route Frequency Ordered    10/29/22 0326  insulin aspart U-100 pen 0-5 Units         -- SubQ Before meals & nightly PRN 10/29/22 0231        -Hold Oral hypoglycemics while patient is in the hospital.  -Complaint with metformin  -Repeat A1c ordered  -Diabetic diet when no longer NPO  -Accuchecks AC/HS      Final Active Diagnoses:    Diagnosis Date Noted POA    PRINCIPAL PROBLEM:  Chest pain [R07.9] " 10/29/2022 Yes    Unspecified urinary incontinence [R32] 10/29/2022 Yes    Near syncope [R55] 10/29/2022 Yes    Hypothyroidism [E03.9] 01/27/2020 Yes    PVD (peripheral vascular disease) [I73.9] 01/27/2020 Yes    Type II diabetes mellitus with peripheral circulatory disorder [E11.51] 11/21/2019 Yes    Benign prostatic hyperplasia [N40.0] 07/26/2019 Yes    CAD (coronary artery disease) [I25.10] 03/09/2015 Yes    Essential hypertension [I10] 03/09/2015 Yes    NSTEMI (non-ST elevated myocardial infarction) [I21.4] 03/09/2015 Yes      Problems Resolved During this Admission:       Discharged Condition: stable    Disposition: Home or Self Care    Follow Up:   Follow-up Information     Alvino Ennis MD Follow up in 1 week(s).    Specialty: Family Medicine  Why: Hospital follow up chest pain with troponin elevation  Contact information:  [address]  [facility] [address]  [phone]                       Patient Instructions:      Diet diabetic     Notify your health care provider if you experience any of the following:  persistent nausea and vomiting or diarrhea     Notify your health care provider if you experience any of the following:  severe uncontrolled pain     Notify your health care provider if you experience any of the following:  difficulty breathing or increased cough     Notify your health care provider if you experience any of the following:  persistent dizziness, light-headedness, or visual disturbances     Activity as tolerated         Pending Diagnostic Studies:     Procedure Component Value Units Date/Time    Echo [[id number]]     Order Status: Sent Lab Status: No result          Medications:  Reconciled Home Medications:      Medication List      CONTINUE taking these medications    aspirin 81 MG EC tablet  Commonly known as: ECOTRIN  Take 1 tablet (81 mg total) by mouth once daily.     isosorbide mononitrate 30 MG 24 hr tablet  Commonly known as: IMDUR  Take 30 mg by mouth once daily.      levothyroxine 25 MCG tablet  Commonly known as: SYNTHROID  Take 50 mcg by mouth.     lisinopriL 10 MG tablet  Take 2 tablets (20 mg total) by mouth once daily.     metFORMIN 500 MG tablet  Commonly known as: GLUCOPHAGE  Take 500 mg by mouth 2 (two) times daily with meals.     metoprolol succinate 25 MG 24 hr tablet  Commonly known as: TOPROL-XL  TK 1/2 T PO QD     mupirocin 2 % ointment  Commonly known as: BACTROBAN  Apply topically 3 (three) times daily.     NITROSTAT 0.4 MG SL tablet  Generic drug: nitroGLYCERIN  Place 0.4 mg under the tongue.     rosuvastatin 20 MG tablet  Commonly known as: CRESTOR  Take 1 tablet (20 mg total) by mouth every evening. For cholesterol     salicylic acid 10 % Crea  Apply 1 g topically 2 (two) times a day.     terazosin 2 MG capsule  Commonly known as: HYTRIN  Take 1 capsule (2 mg total) by mouth once daily.            Indwelling Lines/Drains at time of discharge:   Lines/Drains/Airways     None                 Time spent on the discharge of patient: 36 minutes         Yesica Andujar PA-C  Department of Hospital Medicine  [facility] - Observation 11H

## 2022-10-29 NOTE — ASSESSMENT & PLAN NOTE
Patient's FSGs are controlled on current medication regimen.  Last A1c reviewed-   Lab Results   Component Value Date    LABA1C 7.6 (H) 11/20/2019    HGBA1C 5.2 07/19/2021     Most recent fingerstick glucose reviewed-   Recent Labs   Lab 10/29/22  0417   POCTGLUCOSE 84     Current correctional scale  Low  Maintain anti-hyperglycemic dose as follows-   Antihyperglycemics (From admission, onward)    Start     Stop Route Frequency Ordered    10/29/22 0326  insulin aspart U-100 pen 0-5 Units         -- SubQ Before meals & nightly PRN 10/29/22 0231        -Hold Oral hypoglycemics while patient is in the hospital.  -Complaint with metformin  -Repeat A1c ordered  -Diabetic diet when no longer NPO  -Accuchecks AC/HS

## 2022-10-29 NOTE — HOSPITAL COURSE
Johny Farrell is placed in  observation for chest pain/ ACS rule out. EKG without ischemia. Trop peaked 0.047 -> 0.076 -> 0.055. No recurrence of chest pain. Echo revealed normal cardiac function, no wall motion abnormalities. Dc home with PCP follow up.

## 2022-10-29 NOTE — H&P
[facility] Dept  [facility] Medicine  History & Physical    Patient Name: Johny Farrell  MRN: [id number]  Patient Class: OP- Observation  Admission Date: 10/28/2022  Attending Physician: Lucio Turpin MD   Primary Care Provider: Alvino Ennis MD         Patient information was obtained from patient, past medical records and ER records.     Subjective:     Principal Problem:<principal problem not specified>    Chief Complaint:   Chief Complaint   Patient presents with    Dizziness     Near syncopal episode. Orthostatic with EMS        HPI: Johny Farrell  is a 62-year-old male with a history of CAD, diabetes, hypertension, hypothyroidism, PVD admitted to hospital medicine with chest pain and lightheadedness.  He reports he was sitting in an apartment watching TV when he developed a distinct knocking type pain in his chest radiating with a tightness in his left neck with associated diaphoresis, nausea, lightheadedness and blurred vision. He states he felt as though he was going to pass out.  He then suddenly experienced bladder incontinence and rushed to the toilet because he felt as though he was going to lose bowel continence as well- though never did. Has never experienced bladder incontinence previously, no history of seizures and no seizure-like activity noted by witnesses. No LOC.  He called EMS because the chest pain episode was similar to previous heart attacks reportedly in the 90's and in 2016. He has never had stents placed and was told he did not need them previously. Denies current chest pain and states it dissipated for the most part prior to arrival of EMS. No aggravating or alleviating factors. He has been out of his nitroglycerin, aspirin, and lisinopril for ~1 week. He has social barriers that make obtaining medications difficult. He was told his BP was low after experiencing multiple falls in the past and Imdur was discontinued. Denies smoking tobacco, ETOH use. Denies any recent  fevers or chills, syncope, shortness of breath, dizziness, palpitations, headache, dysuria, back or leg pain, or saddle anesthesia. Ambulates with a cane for chronic left knee stiffness/pain. Per EMS, patient was orthostatic in the field.    In the ED, VSS on RA satting 100%. HR 50s-70s. Afebrile no leukocytosis. H/H 11.5/33.8. Bicarb 19, Cr 0.9 at baseline. BNP <10, Troponin 0.047-->0.076. EKG sinus bradycardia. CXR no acute process. Given 500 cc NS bolus.       Past Medical History:   Diagnosis Date    Benign prostatic hyperplasia 7/26/2019    BPH (benign prostatic hyperplasia)     CAD (coronary artery disease) 3/9/2015    Chest pain on exertion 4/20/2015    Claudication, class II 12/22/2015    Left sided.    Diabetes mellitus type II, controlled 11/21/2019    Hemoptysis 12/12/2014    HTN (hypertension) 3/9/2015    Hyperlipidemia LDL goal <70 1/27/2020    Hypokalemia 1/27/2020    Hypothyroidism, adult 1/27/2020    Laryngopharyngeal reflux (LPR) 12/12/2014    Microscopic hematuria 7/26/2019    Nocturia 7/26/2019    NSTEMI (non-ST elevated myocardial infarction) 3/9/2015    Perforation of tympanic membrane 5/3/2013    dx update dx update    Prostate cancer screening 7/26/2019    PVD (peripheral vascular disease) 1/27/2020       No past surgical history on file.    Review of patient's allergies indicates:   Allergen Reactions    Anesthetics - amide type - select amino amides Other (See Comments)     REPOTS ALLERGY TO UNKNOWN GENERAL ANESTHETIC, REPORTS WAS INTUBATED  REPOTS ALLERGY TO UNKNOWN GENERAL ANESTHETIC, REPORTS WAS INTUBATED         No current facility-administered medications on file prior to encounter.     Current Outpatient Medications on File Prior to Encounter   Medication Sig    aspirin (ECOTRIN) 81 MG EC tablet Take 1 tablet (81 mg total) by mouth once daily.    isosorbide mononitrate (IMDUR) 30 MG 24 hr tablet Take 30 mg by mouth once daily.    levothyroxine (SYNTHROID) 25 MCG  tablet Take 50 mcg by mouth.    lisinopriL 10 MG tablet Take 2 tablets (20 mg total) by mouth once daily.    metFORMIN (GLUCOPHAGE) 500 MG tablet Take 500 mg by mouth 2 (two) times daily with meals.    metoprolol succinate (TOPROL-XL) 25 MG 24 hr tablet TK 1/2 T PO QD    mupirocin (BACTROBAN) 2 % ointment Apply topically 3 (three) times daily.    nitroGLYCERIN (NITROSTAT) 0.4 MG SL tablet Place 0.4 mg under the tongue.    rosuvastatin (CRESTOR) 20 MG tablet Take 1 tablet (20 mg total) by mouth every evening. For cholesterol    salicylic acid 10 % Crea Apply 1 g topically 2 (two) times a day.    terazosin (HYTRIN) 2 MG capsule Take 1 capsule (2 mg total) by mouth once daily.     Family History    None       Tobacco Use    Smoking status: Never    Smokeless tobacco: Former   Substance and Sexual Activity    Alcohol use: Never    Drug use: Never    Sexual activity: Not on file     Review of Systems   Constitutional:  Positive for diaphoresis. Negative for activity change, chills and fever.   HENT:  Negative for trouble swallowing.    Eyes:  Positive for visual disturbance (intermittently). Negative for photophobia.   Respiratory:  Negative for cough, chest tightness, shortness of breath and wheezing.    Cardiovascular:  Positive for chest pain. Negative for palpitations and leg swelling.   Gastrointestinal:  Positive for nausea. Negative for abdominal pain, constipation, diarrhea and vomiting.   Genitourinary:  Negative for dysuria, frequency, hematuria and urgency.        One episode of bladder incontinence    Musculoskeletal:  Positive for gait problem (chronic). Negative for back pain, joint swelling and neck stiffness.   Skin:  Negative for color change and rash.   Neurological:  Positive for light-headedness. Negative for dizziness, seizures, syncope, numbness and headaches.   Psychiatric/Behavioral:  Negative for agitation and confusion. The patient is not nervous/anxious.    Objective:     Vital  Signs (Most Recent):  Temp: 98.4 °F (36.9 °C) (10/29/22 0252)  Pulse: (!) 51 (10/29/22 0232)  Resp: 11 (10/29/22 0232)  BP: 111/62 (10/29/22 0232)  SpO2: 100 % (10/29/22 0232)   Vital Signs (24h Range):  Temp:  [97.9 °F (36.6 °C)-98.4 °F (36.9 °C)] 98.4 °F (36.9 °C)  Pulse:  [51-72] 51  Resp:  [11-19] 11  SpO2:  [98 %-100 %] 100 %  BP: (111-144)/(56-70) 111/62     Weight: 82.6 kg (182 lb)  Body mass index is 24.68 kg/m².    Physical Exam  Vitals and nursing note reviewed.   Constitutional:       General: He is not in acute distress.     Appearance: He is well-developed. He is not diaphoretic.   HENT:      Head: Normocephalic and atraumatic.      Mouth/Throat:      Pharynx: No oropharyngeal exudate.   Eyes:      Extraocular Movements: Extraocular movements intact.      Conjunctiva/sclera: Conjunctivae normal.      Pupils: Pupils are equal, round, and reactive to light.   Cardiovascular:      Rate and Rhythm: Normal rate and regular rhythm.      Heart sounds: Normal heart sounds.   Pulmonary:      Effort: Pulmonary effort is normal. No respiratory distress.      Breath sounds: Normal breath sounds. No wheezing.   Abdominal:      General: Bowel sounds are normal. There is no distension.      Palpations: Abdomen is soft.      Tenderness: There is no abdominal tenderness.   Musculoskeletal:         General: No tenderness. Normal range of motion.      Cervical back: Normal range of motion and neck supple.      Right lower leg: No edema.      Left lower leg: No edema.   Lymphadenopathy:      Cervical: No cervical adenopathy.   Skin:     General: Skin is warm and dry.      Capillary Refill: Capillary refill takes less than 2 seconds.      Findings: No rash.   Neurological:      Mental Status: He is alert and oriented to person, place, and time.      Cranial Nerves: No cranial nerve deficit.      Sensory: No sensory deficit.      Coordination: Coordination normal.   Psychiatric:         Behavior: Behavior normal.          "Thought Content: Thought content normal.         Judgment: Judgment normal.         CRANIAL NERVES     CN III, IV, VI   Pupils are equal, round, and reactive to light.     Significant Labs: All pertinent labs within the past 24 hours have been reviewed.  CBC:   Recent Labs   Lab 10/29/22  0139   WBC 7.22   HGB 11.5*   HCT 33.8*        CMP:   Recent Labs   Lab 10/29/22  0139      K 3.7      CO2 19*   *   BUN 17   CREATININE 0.9   CALCIUM 8.1*   PROT 6.4   ALBUMIN 3.3*   BILITOT 0.4   ALKPHOS 65   AST 26   ALT 15   ANIONGAP 12     Cardiac Markers:   Recent Labs   Lab 10/29/22  0139   BNP <10     Troponin:   Recent Labs   Lab 10/29/22  0139 10/29/22  0250   TROPONINI 0.047* 0.076*     TSH:   Recent Labs   Lab 10/29/22  0250   TSH 2.953       Significant Imaging: I have reviewed all pertinent imaging results/findings within the past 24 hours.  X-Ray Chest AP Portable  Narrative: EXAMINATION:  XR CHEST AP PORTABLE    CLINICAL HISTORY:  Chest Pain;    TECHNIQUE:  Single frontal view of the chest was performed.    COMPARISON:  11/19/2019    FINDINGS:  Cardiac silhouette appears within normal limits.  Lungs are symmetrically expanded without evidence of confluent airspace consolidation.  No significant volume of pleural fluid or pneumothorax identified.  The visualized osseous structures demonstrate mild degenerative changes.  Impression: No radiographic evidence of acute intrathoracic process on this single view.    Electronically signed by: Bradford Andrade MD  Date:    10/29/2022  Time:    02:56     Assessment/Plan:     Near syncope  -presented with acute episode of chest pain, nausea, lightheadedness, diaphoresis. Denies LOC but states he was feeling as though he might  -Reports being told to stop imdur due to "falling" episodes in the past  -Orthostatic per EMS, reportedly negative orthostats in ED   -S/p 500 cc bolus in ED  -Repeat orthostats ordered  -Echo ordered  -holding home BP meds for " "now, resume as appropriate  -fall precautions    Unspecified urinary incontinence  -complains of episode of urinary incontinence while experiencing chest pain/lightheadness prior to admission  -no previous episodes of incontinence   -no current concern for neurological disorder/seizures  -takes terazosin for BPH, hold for now  -UA ordered  -monitor for recurrence    Chest pain  CAD (coronary artery disease)   HO: NSTEMI (non-ST elevated myocardial infarction)  62 y.o. who presents with chest pain with radiation to the neck that is consistent with prior episodes of angina. Pain is not reproducible on exam. Similar symptoms to prior NSTEMI raises concern for ACS. Ran out of nitroglycerin, aspirin, lisinopril at home. Chest pain resolved at arrival to ED.       - EKG without ST-segment elevation or depression, HR 52  - Initial troponin 0.047->0.076, will continue to trend  - Nitroglycerin PRN  -  administered  - Continue ASA, statin, BB; plavix discontinued by cardiologist ~2021  - CXR no acute process, BNP <10  - NPO at midnight as precuation  - CBC, CMP (goal Mag>2, K>4) daily; lipid panel ordered  - Cardiac telemetry  - 2D ECHO ordered  - Previous cardiac testing with EKG stress test, echo, myocardial perfusion imaging   - Risk factors: HTN, HLD, diabetes mellitus  - Discussed with on-call cardiology - recommend obtaining echo and continue to trend troponin. No need for ACS protocol at this time. If troponin rises or echo shows new WMAs, start ACS protocol w/ heparin gtt and consult interventional cardiology.     PVD (peripheral vascular disease)  Chronic, stable  Continue statin, asa    Hypothyroidism  -Chronic, stable  -continue synthroid  -TSH 2.953 (10/29)    Essential hypertension  -Chronic, low-normal blood pressures on admission  -Reportedly orthostatic with EMS  -complaints of possible near-syncope episode  -Was previously instructed to discontinue Imdur due to multiple "falling" events  -Has been out " of lisinopril for ~I week  -Re-check orthostats in AM  -Hold home medications for now as blood pressure is low-normal with regular use of metoprolol only  -Resume as appropriate    Type II diabetes mellitus with peripheral circulatory disorder  Patient's FSGs are controlled on current medication regimen.  Last A1c reviewed-   Lab Results   Component Value Date    LABA1C 7.6 (H) 11/20/2019    HGBA1C 5.2 07/19/2021     Most recent fingerstick glucose reviewed-   Recent Labs   Lab 10/29/22  0417   POCTGLUCOSE 84     Current correctional scale  Low  Maintain anti-hyperglycemic dose as follows-   Antihyperglycemics (From admission, onward)    Start     Stop Route Frequency Ordered    10/29/22 0326  insulin aspart U-100 pen 0-5 Units         -- SubQ Before meals & nightly PRN 10/29/22 0231        -Hold Oral hypoglycemics while patient is in the hospital.  -Complaint with metformin  -Repeat A1c ordered  -Diabetic diet when no longer NPO  -Accuchecks AC/HS    Benign prostatic hyperplasia  -chronic, stable  -hold home terazosin for now in setting of incontinence and low-normal blood pressure/near-syncope  -monitor for further episodes of incontinence       VTE Risk Mitigation (From admission, onward)         Ordered     IP VTE LOW RISK PATIENT  Once         10/29/22 0231     Place sequential compression device  Until discontinued         10/29/22 0231     Place sequential compression device  Until discontinued         10/29/22 0231     IP VTE LOW RISK PATIENT  Once         10/29/22 0231                   Kecia Butterfield PA-C  Department of Hospital Medicine   [facility] - Emergency Dept

## 2022-10-30 DIAGNOSIS — L98.9 LESION OF FINGER: Primary | ICD-10-CM

## 2022-12-12 ENCOUNTER — PATIENT OUTREACH (OUTPATIENT)
Dept: ADMINISTRATIVE | Facility: HOSPITAL | Age: 62
End: 2022-12-12
Payer: MEDICAID

## 2023-06-09 ENCOUNTER — NURSE TRIAGE (OUTPATIENT)
Dept: ADMINISTRATIVE | Facility: CLINIC | Age: 63
End: 2023-06-09
Payer: MEDICAID

## 2023-06-09 NOTE — TELEPHONE ENCOUNTER
OOC incoming call - Pt c/o went to alf on sat morning and started pain in groin, urine came out red, severe pain, cosmo blood when urinating,now out of alf, has to hold on to things to stand up, hematuria protocol followed and pt advised to hang up and call 911 now for immediate medical attention on site by ems for possible life threatening condition. Alert and oriented, Pt agrees to follow through with dispo. Encounter routed to PCP/staff as high priority.   Reason for Disposition   Shock suspected (e.g., cold/pale/clammy skin, too weak to stand, low BP, rapid pulse)    Protocols used: Urine - Blood In-A-OH

## 2023-08-05 ENCOUNTER — HOSPITAL ENCOUNTER (EMERGENCY)
Facility: HOSPITAL | Age: 63
Discharge: HOME | End: 2023-08-05
Attending: EMERGENCY MEDICINE
Payer: MEDICAID

## 2023-08-05 VITALS
SYSTOLIC BLOOD PRESSURE: 150 MMHG | TEMPERATURE: 98 F | DIASTOLIC BLOOD PRESSURE: 72 MMHG | HEART RATE: 50 BPM | HEIGHT: 72 IN | BODY MASS INDEX: 18.96 KG/M2 | WEIGHT: 140 LBS | OXYGEN SATURATION: 99 % | RESPIRATION RATE: 12 BRPM

## 2023-08-05 DIAGNOSIS — N40.0 ENLARGED PROSTATE: ICD-10-CM

## 2023-08-05 DIAGNOSIS — R31.9 HEMATURIA, UNSPECIFIED TYPE: Primary | ICD-10-CM

## 2023-08-05 DIAGNOSIS — N40.0 BENIGN PROSTATIC HYPERPLASIA, UNSPECIFIED WHETHER LOWER URINARY TRACT SYMPTOMS PRESENT: ICD-10-CM

## 2023-08-05 LAB
ALBUMIN SERPL BCP-MCNC: 3.4 G/DL (ref 3.5–5.2)
ALP SERPL-CCNC: 65 U/L (ref 55–135)
ALT SERPL W/O P-5'-P-CCNC: 13 U/L (ref 10–44)
ANION GAP SERPL CALC-SCNC: 10 MMOL/L (ref 8–16)
AST SERPL-CCNC: 22 U/L (ref 10–40)
BACTERIA #/AREA URNS AUTO: ABNORMAL /HPF
BASOPHILS # BLD AUTO: 0.02 K/UL (ref 0–0.2)
BASOPHILS NFR BLD: 0.6 % (ref 0–1.9)
BILIRUB SERPL-MCNC: 0.3 MG/DL (ref 0.1–1)
BILIRUB UR QL STRIP: NEGATIVE
BUN SERPL-MCNC: 19 MG/DL (ref 8–23)
CALCIUM SERPL-MCNC: 8.9 MG/DL (ref 8.7–10.5)
CHLORIDE SERPL-SCNC: 108 MMOL/L (ref 95–110)
CLARITY UR REFRACT.AUTO: ABNORMAL
CO2 SERPL-SCNC: 23 MMOL/L (ref 23–29)
COLOR UR AUTO: YELLOW
CREAT SERPL-MCNC: 0.9 MG/DL (ref 0.5–1.4)
DIFFERENTIAL METHOD: ABNORMAL
EOSINOPHIL # BLD AUTO: 0 K/UL (ref 0–0.5)
EOSINOPHIL NFR BLD: 0.9 % (ref 0–8)
ERYTHROCYTE [DISTWIDTH] IN BLOOD BY AUTOMATED COUNT: 13.6 % (ref 11.5–14.5)
EST. GFR  (NO RACE VARIABLE): >60 ML/MIN/1.73 M^2
GLUCOSE SERPL-MCNC: 72 MG/DL (ref 70–110)
GLUCOSE UR QL STRIP: ABNORMAL
HCT VFR BLD AUTO: 35.8 % (ref 40–54)
HGB BLD-MCNC: 12.2 G/DL (ref 14–18)
HGB UR QL STRIP: ABNORMAL
HYALINE CASTS UR QL AUTO: 1 /LPF
IMM GRANULOCYTES # BLD AUTO: 0.01 K/UL (ref 0–0.04)
IMM GRANULOCYTES NFR BLD AUTO: 0.3 % (ref 0–0.5)
KETONES UR QL STRIP: NEGATIVE
LEUKOCYTE ESTERASE UR QL STRIP: NEGATIVE
LYMPHOCYTES # BLD AUTO: 1.2 K/UL (ref 1–4.8)
LYMPHOCYTES NFR BLD: 34.9 % (ref 18–48)
MCH RBC QN AUTO: 31.9 PG (ref 27–31)
MCHC RBC AUTO-ENTMCNC: 34.1 G/DL (ref 32–36)
MCV RBC AUTO: 94 FL (ref 82–98)
MICROSCOPIC COMMENT: ABNORMAL
MONOCYTES # BLD AUTO: 0.3 K/UL (ref 0.3–1)
MONOCYTES NFR BLD: 7.5 % (ref 4–15)
NEUTROPHILS # BLD AUTO: 1.9 K/UL (ref 1.8–7.7)
NEUTROPHILS NFR BLD: 55.8 % (ref 38–73)
NITRITE UR QL STRIP: NEGATIVE
NRBC BLD-RTO: 0 /100 WBC
PH UR STRIP: 7 [PH] (ref 5–8)
PLATELET # BLD AUTO: 203 K/UL (ref 150–450)
PMV BLD AUTO: 9.4 FL (ref 9.2–12.9)
POTASSIUM SERPL-SCNC: 3.9 MMOL/L (ref 3.5–5.1)
PROT SERPL-MCNC: 6.6 G/DL (ref 6–8.4)
PROT UR QL STRIP: NEGATIVE
RBC # BLD AUTO: 3.82 M/UL (ref 4.6–6.2)
RBC #/AREA URNS AUTO: 12 /HPF (ref 0–4)
SODIUM SERPL-SCNC: 141 MMOL/L (ref 136–145)
SP GR UR STRIP: 1.02 (ref 1–1.03)
SQUAMOUS #/AREA URNS AUTO: 0 /HPF
URN SPEC COLLECT METH UR: ABNORMAL
WBC # BLD AUTO: 3.47 K/UL (ref 3.9–12.7)
WBC #/AREA URNS AUTO: 1 /HPF (ref 0–5)

## 2023-08-05 PROCEDURE — 85025 COMPLETE CBC W/AUTO DIFF WBC: CPT | Performed by: PHYSICIAN ASSISTANT

## 2023-08-05 PROCEDURE — 81001 URINALYSIS AUTO W/SCOPE: CPT | Performed by: PHYSICIAN ASSISTANT

## 2023-08-05 PROCEDURE — 99284 EMERGENCY DEPT VISIT MOD MDM: CPT | Mod: 25

## 2023-08-05 PROCEDURE — 80053 COMPREHEN METABOLIC PANEL: CPT | Performed by: EMERGENCY MEDICINE

## 2023-08-05 NOTE — ED PROVIDER NOTES
Encounter Date: 8/5/2023       History     Chief Complaint   Patient presents with    Hematuria     X1 week from home     62-year-old male with PMHx of BPH, CAD, T2DM, HTN, HLD presents for hematuria x2 weeks.  He has hematuria daily but not with every void.  Notes intermittent suprapubic discomfort.  Hes had similar symptoms in the past that was treated with medicine. Denies dysuria, urinary frequency, flank pain, abdominal pain, nausea, vomiting, fever, chills, chest pain, shortness of breath, dizziness.     The history is provided by the patient.     Review of patient's allergies indicates:   Allergen Reactions    Anesthetics - amide type - select amino amides Other (See Comments)     REPOTS ALLERGY TO UNKNOWN GENERAL ANESTHETIC, REPORTS WAS INTUBATED  REPOTS ALLERGY TO UNKNOWN GENERAL ANESTHETIC, REPORTS WAS INTUBATED       Past Medical History:   Diagnosis Date    Benign prostatic hyperplasia 7/26/2019    BPH (benign prostatic hyperplasia)     CAD (coronary artery disease) 3/9/2015    Chest pain on exertion 4/20/2015    Claudication, class II 12/22/2015    Left sided.    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    dx update dx update    Prostate cancer screening 7/26/2019    PVD (peripheral vascular disease) 1/27/2020     History reviewed. No pertinent surgical history.  History reviewed. No pertinent family history.  Social History     Tobacco Use    Smoking status: Never    Smokeless tobacco: Former   Substance Use Topics    Alcohol use: Never    Drug use: Never     Review of Systems   Constitutional:  Negative for chills and fever.    Respiratory:  Negative for shortness of breath.    Cardiovascular:  Negative for chest pain.   Gastrointestinal:  Positive for abdominal pain. Negative for diarrhea, nausea and vomiting.   Genitourinary:  Positive for dysuria and hematuria. Negative for decreased urine volume, flank pain, frequency, penile discharge, scrotal swelling and testicular pain.   Neurological:  Negative for dizziness and syncope.       Physical Exam     Initial Vitals   BP Pulse Resp Temp SpO2   08/05/23 1119 08/05/23 1119 08/05/23 1119 08/05/23 1146 08/05/23 1119   132/73 61 12 98.3 °F (36.8 °C) 98 %      MAP       --                Physical Exam    Nursing note and vitals reviewed.  Constitutional: He appears well-developed and well-nourished. He is not diaphoretic. No distress.   HENT:   Head: Normocephalic and atraumatic.   Nose: Nose normal.   Eyes: Conjunctivae and EOM are normal.   Neck: Neck supple.   Cardiovascular:  Normal rate, regular rhythm, normal heart sounds and intact distal pulses.           Pulmonary/Chest: Breath sounds normal. No respiratory distress.   Abdominal: Abdomen is soft. Bowel sounds are normal. He exhibits no distension and no mass. There is no abdominal tenderness.   No right CVA tenderness.  No left CVA tenderness. There is no rebound and no guarding.   Musculoskeletal:      Cervical back: Neck supple.     Neurological: He is alert and oriented to person, place, and time.   Skin: No rash noted.   Psychiatric: He has a normal mood and affect. His behavior is normal. Judgment and thought content normal.         ED Course   Procedures  Labs Reviewed   CBC W/ AUTO DIFFERENTIAL - Abnormal; Notable for the following components:       Result Value    WBC 3.47 (*)     RBC 3.82 (*)     Hemoglobin 12.2 (*)     Hematocrit 35.8 (*)     MCH 31.9 (*)     All other components within normal limits   URINALYSIS, REFLEX TO URINE CULTURE - Abnormal; Notable for the following components:    Appearance, UA Hazy (*)      Glucose, UA 1+ (*)     Occult Blood UA 1+ (*)     All other components within normal limits    Narrative:     Specimen Source->Urine   URINALYSIS MICROSCOPIC - Abnormal; Notable for the following components:    RBC, UA 12 (*)     Bacteria Few (*)     All other components within normal limits    Narrative:     Specimen Source->Urine   COMPREHENSIVE METABOLIC PANEL - Abnormal; Notable for the following components:    Albumin 3.4 (*)     All other components within normal limits          Imaging Results              CT Abdomen Pelvis  Without Contrast (Final result)  Result time 08/05/23 15:08:25      Final result by Law Restrepo MD (08/05/23 15:08:25)                   Impression:      No acute abdominopelvic abnormality on this limited noncontrast exam.    Enlarged prostate with prominent central gland.    Additional findings as above.      Electronically signed by: Law Restrepo  Date:    08/05/2023  Time:    15:08               Narrative:    EXAMINATION:  CT ABDOMEN PELVIS WITHOUT CONTRAST    CLINICAL HISTORY:  Hematuria, microscopic, increased risk for urinary tract malignancy;lower abdominal pain. hematuria;    TECHNIQUE:  Noncontrast images were obtained through the abdomen and pelvis without intravenous contrast.  Coronal and sagittal images were reviewed.    COMPARISON:  CT abdomen pelvis with and without contrast dated 06/30/2017    FINDINGS:  Limited images through the lower chest are unremarkable.    Abdomen and pelvis:    The liver, gallbladder, spleen, pancreas, and adrenal glands demonstrate unremarkable noncontrast appearance.  There is similar left renal cortical hypodensity, too small to characterize though probable cyst.  No hydronephrosis.  Enlarged prostate with prominent central gland with partially indents on the lower bladder.    The GI tract appears normal in caliber with scattered colonic diverticulosis.  Appendix is unremarkable.  Further assessment of bowel limited given absence  of significant abdominal visceral fat.  No significant intrapelvic free fluid or pathologic adenopathy.  Mild abdominal aortic atherosclerosis.    No aggressive osseous lesion.  Hip DJD, more advanced on the left.                                       Medications - No data to display  Medical Decision Making:   Initial Assessment:   62-year-old male with PMHx of BPH, CAD, T2DM, HTN, HLD presents to the ED for hematuria x2 weeks. He is nontoxic appearing.  Hemodynamically stable.  Afebrile.  No abdominal tendernes or CVA tenderness on examination.  I will initiate workup and reassess  Differential Diagnosis:   BPH, UTI, prostate/bladder malignancy, nephrolithiasis  Clinical Tests:   Lab Tests: Ordered and Reviewed  ED Management:  Workup reassuring. No leukocytosis. Hemoglobin stable. CT non-contrast negative for stones. Significant for prominent prostate. Patient has know hx of BPH. Urine with 12 RBCs. Urine appears yellow in appearance. Negative nitrites and leukocytes. Few bacteria noted on microscopic though patient denies dysuria, urinary frequency or flank pain. Will send for culture and treat at that time if indicated. Patient resting comfortable on reexamination. Non-toxic appearing. I believe he is stable for discharge with close follow up. Urology referral place. ED precautions given to return for new or worsening symptoms.   Other:   I discussed test(s) with the performing physician.            Attending Attestation:     Physician Attestation Statement for NP/PA:   I have directed and reviewed the workup performed by the PA/NP.  I performed the substantive portion of the medical decision making.     Other NP/PA Attestation Additions:    History of Present Illness: 62 year old man presents for evaluation of hematuria for 2 week's duration.               ED Course as of 08/07/23 1448   Sat Aug 05, 2023   1322 Occult Blood UA(!): 1+ [HM]   1322 NITRITE UA: Negative [HM]   1322 Leukocytes, UA: Negative [HM]    1322 Hemoglobin(!): 12.2  Stable appearing  [HM]      ED Course User Index  [HM] Kecia Mejia PA-C                 Clinical Impression:   Final diagnoses:  [R31.9] Hematuria, unspecified type (Primary)  [N40.0] Enlarged prostate  [N40.0] Benign prostatic hyperplasia, unspecified whether lower urinary tract symptoms present        ED Disposition Condition    Discharge Stable          ED Prescriptions    None       Follow-up Information       Follow up With Specialties Details Why Contact Info Additional Information    Alvino Ennis MD Family Medicine   [address] [facility] [address]  [phone]       [facility] - Urology [address]rium 4th Fl Urology Schedule an appointment as soon as possible for a visit   [address] [facility] [address] [phone] Main Building, 4th Floor Please park in [facility] and take Atrium elevator    Rectal/Urology, [facility] - Nephrology/Colon & Nephrology, Colon and Rectal Surgery, Urology Schedule an appointment as soon as possible for a visit   [address] [facility] [address]  [phone]       [facility] - Emergency Dept Emergency Medicine  If symptoms worsen [facility] [facility] [id number] [phone]              Kecia Mejia PA-C  08/05/23 8722       Tony Small MD  08/07/23 [id number]

## 2023-08-05 NOTE — DISCHARGE INSTRUCTIONS
Hematuria and a large prostate noted on workup today  Please follow-up with urology.  A referral was placed  Return to the emergency department for new or concerning symptoms

## 2023-08-05 NOTE — ED TRIAGE NOTES
Pt presents to the ED for hematuria started 3 x days ago by personal transport. Hx CAD, DM, and prior biopsy of lesion on bladder.

## 2023-08-06 NOTE — PLAN OF CARE
Peri faxed ambulatory referral  to [facility]rology ( [phone])          Zahira Montano LMSW  Case Management  Emergency Department  [phone]

## 2023-08-18 ENCOUNTER — PATIENT OUTREACH (OUTPATIENT)
Dept: ADMINISTRATIVE | Facility: HOSPITAL | Age: 63
End: 2023-08-18
Payer: MEDICAID

## 2023-08-18 DIAGNOSIS — Z12.12 ENCOUNTER FOR COLORECTAL CANCER SCREENING: ICD-10-CM

## 2023-08-18 DIAGNOSIS — Z12.11 ENCOUNTER FOR COLORECTAL CANCER SCREENING: ICD-10-CM

## 2023-08-18 DIAGNOSIS — E11.51 TYPE II DIABETES MELLITUS WITH PERIPHERAL CIRCULATORY DISORDER: Primary | ICD-10-CM

## 2023-08-18 DIAGNOSIS — Z13.6 SCREENING FOR HEART DISEASE: ICD-10-CM

## 2023-08-18 NOTE — PROGRESS NOTES
Health Maintenance Due   Topic Date Due    Eye Exam  Never done    TETANUS VACCINE  Never done    Colorectal Cancer Screening  Never done    Shingles Vaccine (1 of 2) Never done    COVID-19 Vaccine (2 - Booster for Kishore series) 06/02/2021    Foot Exam  12/10/2021    Pneumococcal Vaccines (Age 0-64) (2 - PCV) 12/22/2021    Diabetes Urine Screening  07/19/2022    High Dose Statin  07/21/2022    LAB APPOINTMENT SCHEDULE, URINE ORDERED

## 2023-09-05 ENCOUNTER — NURSE TRIAGE (OUTPATIENT)
Dept: ADMINISTRATIVE | Facility: CLINIC | Age: 63
End: 2023-09-05
Payer: MEDICAID

## 2023-09-05 NOTE — TELEPHONE ENCOUNTER
Pt reports hematuria that is ongoing. He reports small clots in his urine; urine fluctuates from red to orange in color. He is still able to urinate regularly. Care advice is to be seen in the office today or tomorrow. Pt has not been seen by PCP in two years. Attempted to transfer to central scheduling for pt to make an appt as a new patient; he ended the call while waiting to be transferred. Had recommended pt to go to [facility] [place] [place] if unable to be seen by PCP today.     Reason for Disposition   All other patients with blood in urine  (Exception: Could be normal menstrual bleeding.)    Additional Information   Negative: Shock suspected (e.g., cold/pale/clammy skin, too weak to stand, low BP, rapid pulse)   Negative: Sounds like a life-threatening emergency to the triager   Negative: Recent back or abdominal injury   Negative: Recent genital injury   Negative: Unable to urinate (or only a few drops) > 4 hours and bladder feels very full (e.g., palpable bladder or strong urge to urinate)   Negative: Diffuse (all over) muscle pains in the shoulders, arms, legs, and back and dark (cola or tea-colored) or red-colored urine   Negative: Passing pure blood or large blood clots (i.e., larger than a dime or grape)   Negative: Fever > 100.4 F (38.0 C)   Negative: Patient sounds very sick or weak to the triager   Negative: Known sickle cell disease   Negative: Taking Coumadin (warfarin) or other strong blood thinner, or known bleeding disorder (e.g., thrombocytopenia)   Negative: Side (flank) or back pain present   Negative: Pain or burning with passing urine (urination)   Negative: Patient wants to be seen   Negative: Pink or red-colored urine and likely from food (beets, rhubarb, red food dye) and lasts > 24 hours after stopping food    Protocols used: Urine - Blood In-A-OH

## 2023-09-07 ENCOUNTER — TELEPHONE (OUTPATIENT)
Dept: PRIMARY CARE CLINIC | Facility: CLINIC | Age: 63
End: 2023-09-07
Payer: MEDICAID

## 2023-09-07 NOTE — TELEPHONE ENCOUNTER
Called pt and was unable to speak with him, no answer and I was unable to leave a message since his VM was full. Dr. Ennis does not have any appts any time soon, soonest appt is with NP next week.

## 2023-09-07 NOTE — TELEPHONE ENCOUNTER
----- Message from Bibiana Alcazar sent at 9/5/2023  9:18 AM CDT -----  Contact: [phone]  Caller is requesting an earlier appointment then we can schedule.  Caller is requesting a message be sent to the provider.  If this is for urgent care symptoms, did you offer other providers at this location, providers at other locations, or [facility]? (yes, no, n/a):  n/a  If this is for the patients physical, did you offer to schedule next available and put on wait list, or to see NP or PA for their physical?  (yes, no, n/a):  n/a  When is the next available appointment with their provider:  10/24/23  Reason for the appointment:   er f/u, infected toe.  Patient preference of timeframe to be scheduled:ASAP    Would the patient like a call back, or a response through their [facility] portal?:   phone  Comments:  [facility] but [facility] refused, would like to be seen by pcp ASAP. Thank you

## 2023-10-13 ENCOUNTER — CLINICAL SUPPORT (OUTPATIENT)
Dept: ENDOSCOPY | Facility: HOSPITAL | Age: 63
End: 2023-10-13
Attending: RADIOLOGY
Payer: MEDICAID

## 2023-10-13 ENCOUNTER — TELEPHONE (OUTPATIENT)
Dept: ENDOSCOPY | Facility: HOSPITAL | Age: 63
End: 2023-10-13

## 2023-10-13 VITALS — HEIGHT: 72 IN | BODY MASS INDEX: 18.96 KG/M2 | WEIGHT: 140 LBS

## 2023-10-13 DIAGNOSIS — Z12.12 ENCOUNTER FOR COLORECTAL CANCER SCREENING: ICD-10-CM

## 2023-10-13 DIAGNOSIS — Z12.11 ENCOUNTER FOR COLORECTAL CANCER SCREENING: ICD-10-CM

## 2023-10-13 NOTE — TELEPHONE ENCOUNTER
PAT call to schedule patient for colonoscopy. Pt has to be cleared by PCP/cardiologist before can be scheduled. Lost contact with patient while talking to him and tried to call back but got voicemail. Message left with instructions and number to call after he sees PCP.

## 2024-06-15 ENCOUNTER — OFFICE VISIT (OUTPATIENT)
Dept: PRIMARY CARE CLINIC | Facility: CLINIC | Age: 64
End: 2024-06-15
Payer: MEDICAID

## 2024-06-15 VITALS
OXYGEN SATURATION: 98 % | TEMPERATURE: 98 F | DIASTOLIC BLOOD PRESSURE: 60 MMHG | BODY MASS INDEX: 20.5 KG/M2 | RESPIRATION RATE: 20 BRPM | SYSTOLIC BLOOD PRESSURE: 104 MMHG | HEIGHT: 72 IN | WEIGHT: 151.38 LBS | HEART RATE: 77 BPM

## 2024-06-15 DIAGNOSIS — M79.671 PAIN IN RIGHT FOOT: Primary | ICD-10-CM

## 2024-06-15 DIAGNOSIS — E11.51 TYPE II DIABETES MELLITUS WITH PERIPHERAL CIRCULATORY DISORDER: ICD-10-CM

## 2024-06-15 DIAGNOSIS — L84 CORN OF FOOT: ICD-10-CM

## 2024-06-15 DIAGNOSIS — E78.5 HYPERLIPIDEMIA LDL GOAL <70: ICD-10-CM

## 2024-06-15 DIAGNOSIS — I10 ESSENTIAL HYPERTENSION: ICD-10-CM

## 2024-06-15 DIAGNOSIS — Z98.890 HISTORY OF BUNIONECTOMY OF RIGHT GREAT TOE: ICD-10-CM

## 2024-06-15 DIAGNOSIS — E03.9 HYPOTHYROIDISM, UNSPECIFIED TYPE: ICD-10-CM

## 2024-06-15 DIAGNOSIS — Z00.00 ADULT GENERAL MEDICAL EXAMINATION: ICD-10-CM

## 2024-06-15 PROCEDURE — 3074F SYST BP LT 130 MM HG: CPT | Mod: CPTII,,,

## 2024-06-15 PROCEDURE — 99999 PR PBB SHADOW E&M-EST. PATIENT-LVL V: CPT | Mod: PBBFAC,,,

## 2024-06-15 PROCEDURE — 99215 OFFICE O/P EST HI 40 MIN: CPT | Mod: PBBFAC,PN,25

## 2024-06-15 PROCEDURE — 96372 THER/PROPH/DIAG INJ SC/IM: CPT | Mod: PBBFAC,PN

## 2024-06-15 PROCEDURE — 99214 OFFICE O/P EST MOD 30 MIN: CPT | Mod: S$PBB,,,

## 2024-06-15 PROCEDURE — 3008F BODY MASS INDEX DOCD: CPT | Mod: CPTII,,,

## 2024-06-15 PROCEDURE — 1159F MED LIST DOCD IN RCRD: CPT | Mod: CPTII,,,

## 2024-06-15 PROCEDURE — 3078F DIAST BP <80 MM HG: CPT | Mod: CPTII,,,

## 2024-06-15 PROCEDURE — 99999PBSHW PR PBB SHADOW TECHNICAL ONLY FILED TO HB: Mod: PBBFAC,,,

## 2024-06-15 PROCEDURE — 1160F RVW MEDS BY RX/DR IN RCRD: CPT | Mod: CPTII,,,

## 2024-06-15 RX ORDER — NAPROXEN 500 MG/1
500 TABLET ORAL 2 TIMES DAILY WITH MEALS
Qty: 30 TABLET | Refills: 0 | Status: SHIPPED | OUTPATIENT
Start: 2024-06-15

## 2024-06-15 RX ORDER — KETOROLAC TROMETHAMINE 30 MG/ML
30 INJECTION, SOLUTION INTRAMUSCULAR; INTRAVENOUS
Status: COMPLETED | OUTPATIENT
Start: 2024-06-15 | End: 2024-06-15

## 2024-06-15 RX ADMIN — KETOROLAC TROMETHAMINE 30 MG: 30 INJECTION INTRAMUSCULAR; INTRAVENOUS at 09:06

## 2024-06-15 NOTE — PROGRESS NOTES
"Subjective     Patient ID: Johny Farrell is a 63 y.o. male.    Chief Complaint: Foot Pain    Johny Farrell is a 63 year old male, presents to clinic for general medical exam with complain of foot pain.  New to me. PCP is Dr. Ennis. Medical and surgical history, in addition to problem list reviewed and listed below.    Diabetes - Not taking metformin. Hemoglobin A1C pending to be drawn.  Hypertension - States taking blood pressure medicine: aspirin, lisinopril and statin.  Hyperlipidemia - Taking atorvastatin.  Hypothyroidism - Not taking levothyroxine.    Patient reports since out from being incarcerated, need to see how to maneuver, get back on track with visits.  Plan to get transportation service setup today for appointment prior to leaving clinic today.       Foot Pain  Chronicity: Had bunion in area in 2016: had surgery 2017 or 2018.  Noticed since early part of 2023, after released from incarceration. The current episode started more than 1 year ago. The problem occurs constantly. The problem has been gradually worsening. Associated symptoms include joint swelling. Pertinent negatives include no abdominal pain, anorexia, arthralgias, change in bowel habit, chest pain, chills, congestion, coughing, diaphoresis, fatigue, fever, headaches, myalgias, nausea, neck pain, numbness, rash, sore throat, swollen glands, urinary symptoms, vertigo, visual change, vomiting or weakness. Associated symptoms comments: "Get toothache pain and thumping". Has tingling that goes in and out.. The symptoms are aggravated by walking and standing ("Or just the slightest touch"). He has tried position changes and rest (Wear a slipper) for the symptoms. The treatment provided no relief.     Review of Systems   Constitutional:  Negative for chills, diaphoresis, fatigue and fever.   HENT:  Negative for nasal congestion and sore throat.    Respiratory:  Negative for cough and chest tightness.    Cardiovascular:  Negative for chest " pain and palpitations.   Gastrointestinal:  Negative for abdominal pain, anorexia, change in bowel habit, nausea and vomiting.   Musculoskeletal:  Positive for joint swelling. Negative for arthralgias, myalgias and neck pain.   Integumentary:  Negative for rash.   Neurological:  Negative for vertigo, weakness, numbness and headaches.   Psychiatric/Behavioral:  Negative for dysphoric mood, self-injury and suicidal ideas.      Past Medical History:   Diagnosis Date    Benign prostatic hyperplasia 7/26/2019    BPH (benign prostatic hyperplasia)     CAD (coronary artery disease) 3/9/2015    Chest pain on exertion 4/20/2015    Claudication, class II 12/22/2015    Left sided.    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    dx update dx update    Prostate cancer screening 7/26/2019    PVD (peripheral vascular disease) 1/27/2020     Past Surgical History:   Procedure Laterality Date    BUNIONECTOMY Right     2017 or 2018     Social History     Socioeconomic History    Marital status: Single   Tobacco Use    Smoking status: Never    Smokeless tobacco: Former   Substance and Sexual Activity    Alcohol use: Never    Drug use: Never     Patient Active Problem List   Diagnosis    Benign prostatic hyperplasia    Microscopic hematuria    Prostate cancer screening    Nocturia    CAD (coronary artery disease)    Chest pain on exertion    Claudication, class II    Type II diabetes mellitus with peripheral circulatory disorder    Hemoptysis    Essential hypertension    Hypokalemia    Hypothyroidism    Laryngopharyngeal reflux (LPR)    Perforation of tympanic membrane     PVD (peripheral vascular disease)    Hyperlipidemia LDL goal <70    Pain due to onychomycosis of toenail of right foot    Onychomycosis of multiple toenails with type 2 diabetes mellitus    Inflammation of toenail, right    Weakness of left leg    Gait, antalgic    Chest pain    Unspecified urinary incontinence    Near syncope          Objective   Vitals:    06/15/24 0852   BP: 104/60   BP Location: Left arm   Patient Position: Sitting   BP Method: Medium (Automatic)   Pulse: 77   Resp: 20   Temp: 98 °F (36.7 °C)   TempSrc: Oral   SpO2: 98%   Weight: 68.6 kg (151 lb 5.5 oz)   Height: 6' (1.829 m)       Physical Exam  Constitutional:       Appearance: Normal appearance. He is normal weight.   HENT:      Head: Normocephalic.      Right Ear: Tympanic membrane, ear canal and external ear normal.      Left Ear: Tympanic membrane, ear canal and external ear normal.      Nose: Nose normal.      Mouth/Throat:      Mouth: Mucous membranes are moist.   Eyes:      Extraocular Movements: Extraocular movements intact.      Conjunctiva/sclera: Conjunctivae normal.      Pupils: Pupils are equal, round, and reactive to light.   Cardiovascular:      Rate and Rhythm: Normal rate and regular rhythm.      Pulses: Normal pulses.      Heart sounds: Normal heart sounds.   Pulmonary:      Effort: No respiratory distress.      Breath sounds: Normal breath sounds.   Abdominal:      General: Bowel sounds are normal.      Palpations: Abdomen is soft.   Musculoskeletal:         General: Normal range of motion.      Cervical back: Normal range of motion and neck supple.      Right foot: Bunion (with hard corn) present.        Feet:    Feet:      Right foot:      Skin integrity: No erythema.      Toenail Condition: Right toenails are abnormally thick.      Comments: Grimacing with palpation of area.  Skin:     General: Skin is warm and dry.      Capillary Refill: Capillary refill takes less than 2 seconds.   Neurological:      Mental Status: He  is alert and oriented to person, place, and time.   Psychiatric:         Mood and Affect: Mood normal.         Behavior: Behavior normal.        Assessment and Plan     1. Pain in right foot  -     Ambulatory referral/consult to Podiatry; Future; Expected date: 06/22/2024  -     ketorolac injection 30 mg  -     naproxen (NAPROSYN) 500 MG tablet; Take 1 tablet (500 mg total) by mouth 2 (two) times daily with meals.  Dispense: 30 tablet; Refill: 0    2. [place] of foot  -     Ambulatory referral/consult to Podiatry; Future; Expected date: 06/22/2024         -    Avoid tight fitting shoes. Use over the counter corn pads.  Soak foot in warm water.      3. Adult general medical examination  -     CBC Auto Differential; Future; Expected date: 06/15/2024  -     Comprehensive Metabolic Panel; Future; Expected date: 06/15/2024    4. History of bunionectomy of right great toe  -     Ambulatory referral/consult to Podiatry; Future; Expected date: 06/22/2024    5. Type II diabetes mellitus with peripheral circulatory disorder  -     Ambulatory referral/consult to Ophthalmology; Future; Expected date: 06/22/2024    6. Essential hypertension        -     CMP; Future        -     Continue current medical regimen.    7. Hyperlipidemia LDL goal <70        -      Lipid panel pending to be drawn.        -      Continue current medical regimen.    8. Hypothyroidism, unspecified type  -     TSH; Future; Expected date: 06/15/2024  -     T4, Free; Future; Expected date: 06/15/2024         Follow up in about 2 months (around 8/15/2024), or if symptoms worsen or fail to improve.    Lori A. Stephens Sandifer, FNP-C

## 2024-06-17 ENCOUNTER — TELEPHONE (OUTPATIENT)
Dept: PODIATRY | Facility: CLINIC | Age: 64
End: 2024-06-17
Payer: MEDICAID

## 2024-06-17 NOTE — TELEPHONE ENCOUNTER
----- Message from Jennifer Fernández sent at 6/17/2024  8:35 AM CDT -----  Regarding: Referral      What is the request in detail: Please call pt to schedule Podiatry referral.Please advise.    Can the clinic reply by [facility]? No    Best Call Back Number: [phone]       Additional Information:

## 2024-06-30 ENCOUNTER — HOSPITAL ENCOUNTER (EMERGENCY)
Facility: HOSPITAL | Age: 64
Discharge: HOME | End: 2024-06-30
Attending: EMERGENCY MEDICINE
Payer: MEDICAID

## 2024-06-30 VITALS
TEMPERATURE: 98 F | DIASTOLIC BLOOD PRESSURE: 70 MMHG | RESPIRATION RATE: 18 BRPM | SYSTOLIC BLOOD PRESSURE: 138 MMHG | HEART RATE: 55 BPM | WEIGHT: 152 LBS | OXYGEN SATURATION: 100 % | BODY MASS INDEX: 20.61 KG/M2

## 2024-06-30 DIAGNOSIS — M21.611 BUNION OF GREAT TOE OF RIGHT FOOT: Primary | ICD-10-CM

## 2024-06-30 DIAGNOSIS — M79.671 RIGHT FOOT PAIN: ICD-10-CM

## 2024-06-30 PROCEDURE — 25000003 PHARM REV CODE 250

## 2024-06-30 PROCEDURE — 99283 EMERGENCY DEPT VISIT LOW MDM: CPT | Mod: 25

## 2024-06-30 RX ORDER — IBUPROFEN 600 MG/1
600 TABLET ORAL
Status: COMPLETED | OUTPATIENT
Start: 2024-06-30 | End: 2024-06-30

## 2024-06-30 RX ADMIN — IBUPROFEN 600 MG: 600 TABLET, FILM COATED ORAL at 05:06

## 2024-06-30 NOTE — PROVIDER PROGRESS NOTES - EMERGENCY DEPT.
Encounter Date: 6/30/2024    ED Physician Progress Notes          ED Resident HAND-OFF NOTE:  Johny Farrell is a 63 y.o. male who presented to the ED on 6/30/2024, patient C/O toe pain. I assumed care of patient from off-going ED physician team patient pending x-ray.    Briefly, this is a 63-year-old male who has reported toe pain for the last 20 years, states that it has worsened throughout the day.  He is most concerned about the screw that was placed in 2020.    On my evaluation, Johny Farrell appears well, hemodynamically stable and in NAD. Thus far, Johny Farrell has received:  Medications   ibuprofen tablet 600 mg (600 mg Oral Given 6/30/24 0522)       /70   Pulse (!) 55   Temp 97.8 °F (36.6 °C) (Oral)   Resp 18   Wt 68.9 kg (152 lb)   SpO2 100%   BMI 20.61 kg/m²         Disposition: I anticipate patient will be discharged.  ______________________  Susie Crandall MD   Emergency Medicine Resident      UPDATE:     X-ray of the toe showing no acute pathology.  Patient is stable, well-appearing, he is appropriate for discharge at this time.  Counseled patient on ibuprofen/Tylenol for pain relief.  Encouraged patient to follow-up with podiatry.    :  Bunion of great toe of right foot (Primary)  Right foot pain

## 2024-06-30 NOTE — ED TRIAGE NOTES
"Johny Farrell, a 63 y.o. male presents to the ED w/ complaint of pain to right foot and big toe. Pt reports surgery on bunion "years ago" and screw needing to be taken out. Pt reports hasn't been able to get screw out and is having shocking pains from bunion into big toe. Pt reports pain 9/10. Reports relief with nini aspirin x2 days ago. Unable to put weight on front of foot.     Triage note:  Chief Complaint   Patient presents with    Toe Pain     Pain to R great toe, hx of sx to that toe in "80's"     Review of patient's allergies indicates:   Allergen Reactions    Anesthetics - amide type - select amino amides Other (See Comments)     REPOTS ALLERGY TO UNKNOWN GENERAL ANESTHETIC, REPORTS WAS INTUBATED  REPOTS ALLERGY TO UNKNOWN GENERAL ANESTHETIC, REPORTS WAS INTUBATED       Past Medical History:   Diagnosis Date    Benign prostatic hyperplasia 7/26/2019    BPH (benign prostatic hyperplasia)     CAD (coronary artery disease) 3/9/2015    Chest pain on exertion 4/20/2015    Claudication, class II 12/22/2015    Left sided.    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

## 2024-06-30 NOTE — Clinical Note
"Johny"Argenis Farrell was seen and treated in our emergency department on 6/30/2024.  He may return to work on 07/03/2024.       If you have any questions or concerns, please don't hesitate to call.      Randa Westfall MD"

## 2024-06-30 NOTE — DISCHARGE INSTRUCTIONS
You were evaluated in the emergency department today for right toe pain.  Although there were no findings of concern to necessitate admission to the hospital or warrant immediate surgical intervention, disease exists on a spectrum and your disease process may progress.  If this is the case, please watch your symptoms and return to the emergency department if you feel worse and are unable to discuss care with your primary care doctor in follow up in the next several days.  Specific information regarding your complaint has been provided.  Please follow up with the referral you were provided to Podiatry.  Thank you for choosing [facility]!

## 2024-06-30 NOTE — ED PROVIDER NOTES
"Encounter Date: 6/30/2024       History     Chief Complaint   Patient presents with    Toe Pain     Pain to R great toe, hx of sx to that toe in "80's"     This patient is a 63-year-old male who [place] [facility] [place] of right toe pain that is gotten worse throughout the day.  Patient says he can take minimal steps on the foot secondary to pain.  He reports that he had surgery on the right great toe involving screw placement in 2020 but does not know the reason for the surgery.  He denies sensory loss of sensation or paresthesias in his full range of motion of the great toe.      The history is provided by the patient.     Review of patient's allergies indicates:   Allergen Reactions    Anesthetics - amide type - select amino amides Other (See Comments)     REPOTS ALLERGY TO UNKNOWN GENERAL ANESTHETIC, REPORTS WAS INTUBATED  REPOTS ALLERGY TO UNKNOWN GENERAL ANESTHETIC, REPORTS WAS INTUBATED       Past Medical History:   Diagnosis Date    Benign prostatic hyperplasia 7/26/2019    BPH (benign prostatic hyperplasia)     CAD (coronary artery disease) 3/9/2015    Chest pain on exertion 4/20/2015    Claudication, class II 12/22/2015    Left sided.    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  Social History     Tobacco Use    Smoking status: Never    Smokeless tobacco: Former   Substance Use Topics    Alcohol use: " Never    Drug use: Never     Review of Systems    Physical Exam     Initial Vitals [06/30/24 0224]   BP Pulse Resp Temp SpO2   (!) 143/78 (!) 52 20 98.1 °F (36.7 °C) 100 %      MAP       --         Physical Exam    Nursing note and vitals reviewed.  Constitutional: He appears well-developed and well-nourished.   HENT:   Head: Normocephalic and atraumatic.   Eyes: Conjunctivae and EOM are normal. Pupils are equal, round, and reactive to light.   Neck: Neck supple.   Normal range of motion.  Cardiovascular:  Normal rate, regular rhythm, normal heart sounds and intact distal pulses.           Pulmonary/Chest: Breath sounds normal.   Abdominal: Abdomen is soft.   Musculoskeletal:         General: Normal range of motion.      Cervical back: Normal range of motion and neck supple.      Comments: Moderately reduced range of motion of right great toe.  No signs of erythema, swelling or warmth indicative of joint infection or gout.  No skin breaks.  Bunion noted     Neurological: He is alert and oriented to person, place, and time. He has normal strength. GCS score is 15. GCS eye subscore is 4. GCS verbal subscore is 5. GCS motor subscore is 6.   Skin: Skin is warm and dry. Capillary refill takes less than 2 seconds.   Psychiatric: He has a normal mood and affect. His behavior is normal. Judgment and thought content normal.         ED Course   Procedures  Labs Reviewed   HIV 1 / 2 ANTIBODY   HEPATITIS C ANTIBODY          Imaging Results              X-Ray Toe 2 or More Views Right (In process)                   X-Rays:   Independently Interpreted Readings:   Other Readings:  As per my interpretation, no acute fractures, dislocations or displacement of surgical screw.    Medications   ibuprofen tablet 600 mg (600 mg Oral Given 6/30/24 0522)     Medical Decision Making  63-year-old male as described above presenting with right toe pain with orthopedic screw placed some years ago.  Pain well controlled with p.o. ibuprofen.   Physical exam reassuring with no signs of erythema, swelling, skin breaks or cellulitic changes.  Preserved PROM and AROM.  As per my interpretation, x-ray without acute changes with surgical screw noted.  I anticipate this patient will be able to be discharged home with return precautions and follow up instructions with podiatry outpatient.    Patient is signed out to oncoming provider, Susie Crandall MD pending final read of plain film.    Amount and/or Complexity of Data Reviewed  Radiology: ordered.    Risk  Prescription drug management.                                      Clinical Impression:  Final diagnoses:  [M21.611] Bunion of great toe of right foot (Primary)  [M79.671] Right foot pain                 Randa Westfall MD  Resident  06/30/24 5066